# Patient Record
Sex: FEMALE | Race: ASIAN | NOT HISPANIC OR LATINO | URBAN - METROPOLITAN AREA
[De-identification: names, ages, dates, MRNs, and addresses within clinical notes are randomized per-mention and may not be internally consistent; named-entity substitution may affect disease eponyms.]

---

## 2018-10-01 ENCOUNTER — OUTPATIENT (OUTPATIENT)
Dept: OUTPATIENT SERVICES | Facility: HOSPITAL | Age: 83
LOS: 1 days | End: 2018-10-01

## 2018-10-30 ENCOUNTER — INPATIENT (INPATIENT)
Facility: HOSPITAL | Age: 83
LOS: 1 days | Discharge: HOME | End: 2018-11-01
Attending: HOSPITALIST | Admitting: HOSPITALIST
Payer: MEDICAID

## 2018-10-30 VITALS
OXYGEN SATURATION: 100 % | SYSTOLIC BLOOD PRESSURE: 180 MMHG | RESPIRATION RATE: 20 BRPM | TEMPERATURE: 97 F | HEART RATE: 80 BPM | DIASTOLIC BLOOD PRESSURE: 81 MMHG

## 2018-10-30 DIAGNOSIS — I16.1 HYPERTENSIVE EMERGENCY: ICD-10-CM

## 2018-10-30 DIAGNOSIS — Z29.9 ENCOUNTER FOR PROPHYLACTIC MEASURES, UNSPECIFIED: ICD-10-CM

## 2018-10-30 DIAGNOSIS — E87.1 HYPO-OSMOLALITY AND HYPONATREMIA: ICD-10-CM

## 2018-10-30 DIAGNOSIS — R10.9 UNSPECIFIED ABDOMINAL PAIN: ICD-10-CM

## 2018-10-30 DIAGNOSIS — Z98.890 OTHER SPECIFIED POSTPROCEDURAL STATES: Chronic | ICD-10-CM

## 2018-10-30 DIAGNOSIS — K08.89 OTHER SPECIFIED DISORDERS OF TEETH AND SUPPORTING STRUCTURES: Chronic | ICD-10-CM

## 2018-10-30 LAB
ALBUMIN SERPL ELPH-MCNC: 4.4 G/DL — SIGNIFICANT CHANGE UP (ref 3.3–5)
ALP SERPL-CCNC: 117 U/L — SIGNIFICANT CHANGE UP (ref 40–120)
ALT FLD-CCNC: 13 U/L — SIGNIFICANT CHANGE UP (ref 4–33)
AMYLASE P1 CFR SERPL: 88 U/L — SIGNIFICANT CHANGE UP (ref 25–125)
APPEARANCE UR: CLEAR — SIGNIFICANT CHANGE UP
AST SERPL-CCNC: 21 U/L — SIGNIFICANT CHANGE UP (ref 4–32)
BASE EXCESS BLDV CALC-SCNC: 1.1 MMOL/L — SIGNIFICANT CHANGE UP
BASOPHILS # BLD AUTO: 0.01 K/UL — SIGNIFICANT CHANGE UP (ref 0–0.2)
BASOPHILS NFR BLD AUTO: 0.2 % — SIGNIFICANT CHANGE UP (ref 0–2)
BILIRUB SERPL-MCNC: < 0.2 MG/DL — LOW (ref 0.2–1.2)
BILIRUB UR-MCNC: NEGATIVE — SIGNIFICANT CHANGE UP
BLOOD GAS VENOUS - CREATININE: 0.61 MG/DL — SIGNIFICANT CHANGE UP (ref 0.5–1.3)
BLOOD UR QL VISUAL: NEGATIVE — SIGNIFICANT CHANGE UP
BUN SERPL-MCNC: 5 MG/DL — LOW (ref 7–23)
BUN SERPL-MCNC: 6 MG/DL — LOW (ref 7–23)
CALCIUM SERPL-MCNC: 9.2 MG/DL — SIGNIFICANT CHANGE UP (ref 8.4–10.5)
CALCIUM SERPL-MCNC: 9.6 MG/DL — SIGNIFICANT CHANGE UP (ref 8.4–10.5)
CHLORIDE BLDV-SCNC: 91 MMOL/L — LOW (ref 96–108)
CHLORIDE SERPL-SCNC: 88 MMOL/L — LOW (ref 98–107)
CHLORIDE SERPL-SCNC: 92 MMOL/L — LOW (ref 98–107)
CO2 SERPL-SCNC: 23 MMOL/L — SIGNIFICANT CHANGE UP (ref 22–31)
CO2 SERPL-SCNC: 27 MMOL/L — SIGNIFICANT CHANGE UP (ref 22–31)
COLOR SPEC: SIGNIFICANT CHANGE UP
CREAT SERPL-MCNC: 0.61 MG/DL — SIGNIFICANT CHANGE UP (ref 0.5–1.3)
CREAT SERPL-MCNC: 0.63 MG/DL — SIGNIFICANT CHANGE UP (ref 0.5–1.3)
EOSINOPHIL # BLD AUTO: 0.11 K/UL — SIGNIFICANT CHANGE UP (ref 0–0.5)
EOSINOPHIL NFR BLD AUTO: 1.9 % — SIGNIFICANT CHANGE UP (ref 0–6)
GAS PNL BLDV: 124 MMOL/L — LOW (ref 136–146)
GLUCOSE BLDV-MCNC: 107 — HIGH (ref 70–99)
GLUCOSE SERPL-MCNC: 111 MG/DL — HIGH (ref 70–99)
GLUCOSE SERPL-MCNC: 168 MG/DL — HIGH (ref 70–99)
GLUCOSE UR-MCNC: NEGATIVE — SIGNIFICANT CHANGE UP
HCO3 BLDV-SCNC: 24 MMOL/L — SIGNIFICANT CHANGE UP (ref 20–27)
HCT VFR BLD CALC: 35.9 % — SIGNIFICANT CHANGE UP (ref 34.5–45)
HCT VFR BLDV CALC: 37.2 % — SIGNIFICANT CHANGE UP (ref 34.5–45)
HGB BLD-MCNC: 11.8 G/DL — SIGNIFICANT CHANGE UP (ref 11.5–15.5)
HGB BLDV-MCNC: 12.1 G/DL — SIGNIFICANT CHANGE UP (ref 11.5–15.5)
IMM GRANULOCYTES # BLD AUTO: 0.02 # — SIGNIFICANT CHANGE UP
IMM GRANULOCYTES NFR BLD AUTO: 0.3 % — SIGNIFICANT CHANGE UP (ref 0–1.5)
KETONES UR-MCNC: NEGATIVE — SIGNIFICANT CHANGE UP
LACTATE BLDV-MCNC: 1.1 MMOL/L — SIGNIFICANT CHANGE UP (ref 0.5–2)
LEUKOCYTE ESTERASE UR-ACNC: NEGATIVE — SIGNIFICANT CHANGE UP
LIDOCAIN IGE QN: 40.3 U/L — SIGNIFICANT CHANGE UP (ref 7–60)
LYMPHOCYTES # BLD AUTO: 2.35 K/UL — SIGNIFICANT CHANGE UP (ref 1–3.3)
LYMPHOCYTES # BLD AUTO: 40.4 % — SIGNIFICANT CHANGE UP (ref 13–44)
MCHC RBC-ENTMCNC: 24.9 PG — LOW (ref 27–34)
MCHC RBC-ENTMCNC: 32.9 % — SIGNIFICANT CHANGE UP (ref 32–36)
MCV RBC AUTO: 75.9 FL — LOW (ref 80–100)
MONOCYTES # BLD AUTO: 0.8 K/UL — SIGNIFICANT CHANGE UP (ref 0–0.9)
MONOCYTES NFR BLD AUTO: 13.7 % — SIGNIFICANT CHANGE UP (ref 2–14)
NEUTROPHILS # BLD AUTO: 2.53 K/UL — SIGNIFICANT CHANGE UP (ref 1.8–7.4)
NEUTROPHILS NFR BLD AUTO: 43.5 % — SIGNIFICANT CHANGE UP (ref 43–77)
NITRITE UR-MCNC: NEGATIVE — SIGNIFICANT CHANGE UP
NRBC # FLD: 0 — SIGNIFICANT CHANGE UP
OSMOLALITY SERPL: 263 MOSMO/KG — LOW (ref 275–295)
PCO2 BLDV: 48 MMHG — SIGNIFICANT CHANGE UP (ref 41–51)
PH BLDV: 7.35 PH — SIGNIFICANT CHANGE UP (ref 7.32–7.43)
PH UR: 7 — SIGNIFICANT CHANGE UP (ref 5–8)
PLATELET # BLD AUTO: 314 K/UL — SIGNIFICANT CHANGE UP (ref 150–400)
PMV BLD: 8.9 FL — SIGNIFICANT CHANGE UP (ref 7–13)
PO2 BLDV: < 24 MMHG — LOW (ref 35–40)
POTASSIUM BLDV-SCNC: 4.3 MMOL/L — SIGNIFICANT CHANGE UP (ref 3.4–4.5)
POTASSIUM SERPL-MCNC: 4 MMOL/L — SIGNIFICANT CHANGE UP (ref 3.5–5.3)
POTASSIUM SERPL-MCNC: 4.5 MMOL/L — SIGNIFICANT CHANGE UP (ref 3.5–5.3)
POTASSIUM SERPL-SCNC: 4 MMOL/L — SIGNIFICANT CHANGE UP (ref 3.5–5.3)
POTASSIUM SERPL-SCNC: 4.5 MMOL/L — SIGNIFICANT CHANGE UP (ref 3.5–5.3)
PROT SERPL-MCNC: 7.6 G/DL — SIGNIFICANT CHANGE UP (ref 6–8.3)
PROT UR-MCNC: NEGATIVE — SIGNIFICANT CHANGE UP
RBC # BLD: 4.73 M/UL — SIGNIFICANT CHANGE UP (ref 3.8–5.2)
RBC # FLD: 14.6 % — HIGH (ref 10.3–14.5)
SAO2 % BLDV: 27.9 % — LOW (ref 60–85)
SODIUM SERPL-SCNC: 124 MMOL/L — LOW (ref 135–145)
SODIUM SERPL-SCNC: 130 MMOL/L — LOW (ref 135–145)
SODIUM UR-SCNC: 53 MMOL/L — SIGNIFICANT CHANGE UP
SP GR SPEC: 1.01 — SIGNIFICANT CHANGE UP (ref 1–1.04)
TROPONIN T, HIGH SENSITIVITY: 9 NG/L — SIGNIFICANT CHANGE UP (ref ?–14)
TSH SERPL-MCNC: 4.64 UIU/ML — HIGH (ref 0.27–4.2)
UROBILINOGEN FLD QL: NORMAL — SIGNIFICANT CHANGE UP
WBC # BLD: 5.82 K/UL — SIGNIFICANT CHANGE UP (ref 3.8–10.5)
WBC # FLD AUTO: 5.82 K/UL — SIGNIFICANT CHANGE UP (ref 3.8–10.5)

## 2018-10-30 PROCEDURE — 74177 CT ABD & PELVIS W/CONTRAST: CPT | Mod: 26

## 2018-10-30 RX ORDER — SODIUM CHLORIDE 9 MG/ML
500 INJECTION, SOLUTION INTRAVENOUS ONCE
Qty: 0 | Refills: 0 | Status: COMPLETED | OUTPATIENT
Start: 2018-10-30 | End: 2018-10-30

## 2018-10-30 RX ORDER — INFLUENZA VIRUS VACCINE 15; 15; 15; 15 UG/.5ML; UG/.5ML; UG/.5ML; UG/.5ML
0.5 SUSPENSION INTRAMUSCULAR ONCE
Qty: 0 | Refills: 0 | Status: COMPLETED | OUTPATIENT
Start: 2018-10-30 | End: 2018-11-01

## 2018-10-30 RX ORDER — SENNA PLUS 8.6 MG/1
2 TABLET ORAL AT BEDTIME
Qty: 0 | Refills: 0 | Status: DISCONTINUED | OUTPATIENT
Start: 2018-10-30 | End: 2018-11-01

## 2018-10-30 RX ORDER — POLYETHYLENE GLYCOL 3350 17 G/17G
17 POWDER, FOR SOLUTION ORAL
Qty: 0 | Refills: 0 | Status: DISCONTINUED | OUTPATIENT
Start: 2018-10-30 | End: 2018-11-01

## 2018-10-30 RX ADMIN — SODIUM CHLORIDE 500 MILLILITER(S): 9 INJECTION, SOLUTION INTRAVENOUS at 16:34

## 2018-10-30 RX ADMIN — POLYETHYLENE GLYCOL 3350 17 GRAM(S): 17 POWDER, FOR SOLUTION ORAL at 23:07

## 2018-10-30 RX ADMIN — SENNA PLUS 2 TABLET(S): 8.6 TABLET ORAL at 23:07

## 2018-10-30 NOTE — ED PROVIDER NOTE - PROGRESS NOTE DETAILS
Annette HERNANDEZ, PGY-4: Pt offered pain meds but declining at this time. Will reassess and provide pain meds as needed.

## 2018-10-30 NOTE — H&P ADULT - PROBLEM SELECTOR PLAN 3
- chronic abdominal pain likely in setting of constipation (CT A/P revealing marked stool  - start  on bowel regiment with miralax and senna- if no response, can try enema - chronic abdominal pain likely in setting of constipation (CT A/P revealing marked stool  - start  on bowel regimen with miralax and senna- if no response, can try enema recurrent this AM, unclear etiology; thought to be secondary to poorly controlled hypertension however this AM BP better controlled  trial of meclizine  would perform Katie-Hallpike when family returns to interpret (patient having difficulty following commands from  via phone)  fall precautions, PT consult  CT head pending

## 2018-10-30 NOTE — H&P ADULT - ASSESSMENT
89F with no reported PMHx, presents with one episode of dizziness and chronic abdominal pain, found to have hypo-osmolar hyponatremia, and hypertensive emergency.

## 2018-10-30 NOTE — H&P ADULT - ATTENDING COMMENTS
Agree with resident H&P and plan as edited above.     89F poor historian, family no longer present at bedside reports intermittent dizziness, states she feels as if the room is spinning around her, feels it during my interview/exam as well.  She reports she last fell last night, with (+)LOC, no loss of bowel/bladder control, no preceding cardiac or presyncopal symptoms.  States she was "out" for 4-5 hours then regained consciousness slowly.  Patient is unaware of her medical problems/history or medications that she takes.  CT H pending given unclear history of falls.  Neuro exam intact, will try meclizine for possible BPPV, would attempt to perform Lodi-Hallpike maneuver w/patient in AM, did not attempt during my exam due to difficulty in getting patient to follow my commands with  (multiple attempts/translations required for neuro exam with demonstration from examiner as well). If dizziness persists, may consider stroke w/u w/transfer to tele.

## 2018-10-30 NOTE — H&P ADULT - NSHPPHYSICALEXAM_GEN_ALL_CORE
General: elderly female, laying in bed, NAD  Neurology: A&Ox2 (baseline); non focal neuro exam  ENT:  Mucosa moist, poor dentition; cataracts in b/l eyes;   Neck:  Supple, no sinuses or palpable masses  Lymphatic:  No palpable cervical, supraclavicular, axillary or inguinal adenopathy  Respiratory: CTA B/L  CV: RRR, S1S2, no murmur  Abdominal: Soft, tender to palpation diffusely- markedly in b/l lower quadrants, +NS  MSK: No edema, + peripheral pulses, FROM all 4 extremity General: elderly female, laying in bed, NAD  Neurology: A&Ox2 (baseline); non focal neuro exam;   ENT:  Mucosa moist, poor dentition; cataracts in b/l eyes;   Neck:  Supple, no sinuses or palpable masses  Lymphatic:  No palpable cervical, supraclavicular, axillary or inguinal adenopathy  Respiratory: CTA B/L  CV: RRR, S1S2, no murmur  Abdominal: Soft, tender to palpation diffusely- markedly in b/l lower quadrants, +NS  MSK: No edema, + peripheral pulses, FROM all 4 extremity General: elderly female, laying in bed, NAD  Neurology: A&Ox2 (baseline); non focal neuro exam- CN intact; strength grossly preserved in upper and lower extremities  ENT:  Mucosa moist, poor dentition; cataracts in b/l eyes;   Neck:  Supple, no sinuses or palpable masses  Lymphatic:  No palpable cervical, supraclavicular, axillary or inguinal adenopathy  Respiratory: CTA B/L  CV: RRR, S1S2, no murmur  Abdominal: Soft, tender to palpation diffusely- markedly in b/l lower quadrants, +NS  MSK: No edema, + peripheral pulses, FROM all 4 extremity General: elderly female, laying in bed, NAD  Neurology: A&Ox2 (baseline); non focal neuro exam- CN intact; strength grossly preserved in upper and lower extremities; FTN intact b/l  ENT:  Mucosa moist, poor dentition; cataracts in b/l eyes w/almost complete opacification of L iris   Neck:  Supple, no sinuses or palpable masses  Lymphatic:  No palpable cervical, supraclavicular, axillary or inguinal adenopathy  Respiratory: CTA B/L  CV: RRR, S1S2, no murmur  Abdominal: Soft, tender to palpation diffusely- markedly in b/l lower quadrants, +BS  MSK: No edema, + peripheral pulses, FROM all 4 extremity

## 2018-10-30 NOTE — H&P ADULT - NSHPLABSRESULTS_GEN_ALL_CORE
11.8   5.82  )-----------( 314      ( 30 Oct 2018 15:00 )             35.9       10-30    124<L>  |  88<L>  |  6<L>  ----------------------------<  111<H>  4.0   |  23  |  0.63    Ca    9.2      30 Oct 2018 15:00    TPro  7.6  /  Alb  4.4  /  TBili  < 0.2<L>  /  DBili  x   /  AST  21  /  ALT  13  /  AlkPhos  117  10-30                15:10 - VBG - pH: 7.35  | pCO2: 48    | pO2: < 24  | Lactate: 1.1        Urinalysis Basic - ( 30 Oct 2018 15:10 )    Color: LIGHT YELLOW / Appearance: CLEAR / S.009 / pH: 7.0  Gluc: NEGATIVE / Ketone: NEGATIVE  / Bili: NEGATIVE / Urobili: NORMAL   Blood: NEGATIVE / Protein: NEGATIVE / Nitrite: NEGATIVE   Leuk Esterase: NEGATIVE / RBC: x / WBC x   Sq Epi: x / Non Sq Epi: x / Bacteria: x        EKG:  normal sinus rhythm  CXR:  not done    < from: CT Abdomen and Pelvis w/ IV Cont (10.30.18 @ 18:11) >    IMPRESSION:     No acute intra-abdominal pathology.    A 6 mm right middle lobe solid pulmonary nodule, for which noncontrast   chest CT in 6 months is recommended.    Age indeterminate compression fractures at T12 and L2.      < end of copied text > 10-30    130<L>  |  92<L>  |  5<L>  ----------------------------<  168<H>  4.5   |  27  |  0.61    Ca    9.6      30 Oct 2018 22:37    TPro  7.6  /  Alb  4.4  /  TBili  < 0.2<L>  /  DBili  x   /  AST  21  /  ALT  13  /  AlkPhos  117  10-30                        11.8   5.82  )-----------( 314      ( 30 Oct 2018 15:00 )             35.9     Urinalysis Basic - ( 30 Oct 2018 15:10 )  Color: LIGHT YELLOW / Appearance: CLEAR / S.009 / pH: 7.0  Gluc: NEGATIVE / Ketone: NEGATIVE  / Bili: NEGATIVE / Urobili: NORMAL   Blood: NEGATIVE / Protein: NEGATIVE / Nitrite: NEGATIVE   Leuk Esterase: NEGATIVE / RBC: x / WBC x   Sq Epi: x / Non Sq Epi: x / Bacteria: x    15:10 - VBG - pH: 7.35  | pCO2: 48    | pO2: < 24  | Lactate: 1.1      Osmolality, Random Urine: 204 mosmo/kg (10.30.18 @ 22:37)  Sodium, Serum: 130 mmol/L (10.30.18 @ 22:37)    Osmolality, Serum: 263 mosmo/kg (10.30.18 @ 15:00)    Troponin T, High Sensitivity: 9 ng/L (10.30.18 @ 15:00)    Thyroid Stimulating Hormone, Serum: 4.64 uIU/mL (10.30.18 @ 22:37)  Free Thyroxine, Serum: 0.95 ng/dL (10.30.18 @ 22:37)    Amylase/Lipase (10.30.18 @ 15:00)    Lipase, Serum: 40.3 U/L    Amylase, Serum Total: 88 u/L    EKG:  normal sinus rhythm  CXR:  not done    < from: CT Abdomen and Pelvis w/ IV Cont (10.30.18 @ 18:11) >  LOWER CHEST: A 6 mm right middle lobe solid pulmonary nodule (series 2, image 4). Moderate size hiatal hernia.  LIVER: Within normal limits.  BILE DUCTS: Normal caliber.  GALLBLADDER: Within normal limits.  SPLEEN: Within normal limits.  PANCREAS: Within normal limits.  ADRENALS: Within normal limits.  KIDNEYS/URETERS: Subcentimeter hypodense renal foci, too small to characterize.  BLADDER: Within normal limits.  REPRODUCTIVE ORGANS: The uterus and adnexa are within normal limits.  BOWEL: No bowel obstruction. Appendix is not visualized.  PERITONEUM: No ascites.  VESSELS:  Within normal limits.  RETROPERITONEUM: No lymphadenopathy.    ABDOMINAL WALL: Tiny fat-containing umbilical hernia.  BONES: Degenerative degenerative changes of the spine including mild compression deformities of T12 and L2, age indeterminate.  IMPRESSION: No acute intra-abdominal pathology. A 6 mm right middle lobe solid pulmonary nodule, for which noncontrast chest CT in 6 months is recommended. Age indeterminate compression fractures at T12 and L2.  < end of copied text > 10-30    130<L>  |  92<L>  |  5<L>  ----------------------------<  168<H>  4.5   |  27  |  0.61    Ca    9.6      30 Oct 2018 22:37    TPro  7.6  /  Alb  4.4  /  TBili  < 0.2<L>  /  DBili  x   /  AST  21  /  ALT  13  /  AlkPhos  117  10-30                        11.8   5.82  )-----------( 314      ( 30 Oct 2018 15:00 )             35.9     Urinalysis Basic - ( 30 Oct 2018 15:10 )  Color: LIGHT YELLOW / Appearance: CLEAR / S.009 / pH: 7.0  Gluc: NEGATIVE / Ketone: NEGATIVE  / Bili: NEGATIVE / Urobili: NORMAL   Blood: NEGATIVE / Protein: NEGATIVE / Nitrite: NEGATIVE   Leuk Esterase: NEGATIVE / RBC: x / WBC x   Sq Epi: x / Non Sq Epi: x / Bacteria: x    15:10 - VBG - pH: 7.35  | pCO2: 48    | pO2: < 24  | Lactate: 1.1      Osmolality, Random Urine: 204 mosmo/kg (10.30.18 @ 22:37)  Sodium, Serum: 130 mmol/L (10.30.18 @ 22:37)    Osmolality, Serum: 263 mosmo/kg (10.30.18 @ 15:00)    Troponin T, High Sensitivity: 9 ng/L (10.30.18 @ 15:00)    Thyroid Stimulating Hormone, Serum: 4.64 uIU/mL (10.30.18 @ 22:37)  Free Thyroxine, Serum: 0.95 ng/dL (10.30.18 @ 22:37)    Amylase/Lipase (10.30.18 @ 15:00)    Lipase, Serum: 40.3 U/L    Amylase, Serum Total: 88 u/L    < from: CT Abdomen and Pelvis w/ IV Cont (10.30.18 @ 18:11) >  LOWER CHEST: A 6 mm right middle lobe solid pulmonary nodule (series 2, image 4). Moderate size hiatal hernia.  LIVER: Within normal limits.  BILE DUCTS: Normal caliber.  GALLBLADDER: Within normal limits.  SPLEEN: Within normal limits.  PANCREAS: Within normal limits.  ADRENALS: Within normal limits.  KIDNEYS/URETERS: Subcentimeter hypodense renal foci, too small to characterize.  BLADDER: Within normal limits.  REPRODUCTIVE ORGANS: The uterus and adnexa are within normal limits.  BOWEL: No bowel obstruction. Appendix is not visualized.  PERITONEUM: No ascites.  VESSELS:  Within normal limits.  RETROPERITONEUM: No lymphadenopathy.    ABDOMINAL WALL: Tiny fat-containing umbilical hernia.  BONES: Degenerative changes of the spine including mild compression deformities of T12 and L2, age indeterminate.  IMPRESSION: No acute intra-abdominal pathology. A 6 mm right middle lobe solid pulmonary nodule, for which noncontrast chest CT in 6 months is recommended. Age indeterminate compression fractures at T12 and L2.  < end of copied text >    EKG personally reviewed:  81bpm normal sinus rhythm, QTc 448ms

## 2018-10-30 NOTE — H&P ADULT - PROBLEM SELECTOR PLAN 1
- patient with Na of 124; and serum osm of 263 indicated hypo-osmolar hyponatremia  - on examination, patient appears euvolemic thus ddx includes SIADH, hypothyroids, or medication induced (as unclear at this time what patient takes at home)  - will follow up urine studies and repeat BMP as pt given 500cc LR bolus in the ED  - follow up TSH level  - BMP q6 - patient with Na of 124; and serum osm of 263 indicated hypo-osmolar hyponatremia  - on examination, patient appears euvolemic thus ddx includes SIADH, hypothyroids, or medication induced (as unclear at this time what patient takes at home)  - will follow up urine studies and repeat BMP as pt given 500cc LR bolus in the ED  - follow up TSH level  - BMP q6  - CT head to evaluate for ICH in setting of SIADH on ddx - patient with Na of 124; and serum osm of 263 indicated hypo-osmolar hyponatremia  - on examination, patient appears euvolemic thus ddx includes SIADH, hypothyroids, or medication induced (as unclear at this time what patient takes at home)  - urine studies c/w SIADH; repeat BMP s/p 500cc LR bolus in the ED showed improvement after IVF ->unclear etiology  - free T4 wnl  - BMP q6  - CT head to evaluate for ICH in setting of SIADH on ddx

## 2018-10-30 NOTE — ED ADULT NURSE NOTE - OBJECTIVE STATEMENT
pt brought in by family for weakness/Nausea/LLQ pain since yest. pt AOX 3 denies dysuria/hematuria,  lungs clear, resp. equal 16-20b/min Iv to right AC 20g angio cath.   UA sent clear yin.   pending dispo.    Macrina PEÑA (covering for break)

## 2018-10-30 NOTE — ED PROVIDER NOTE - CONSTITUTIONAL, MLM
normal... Well appearing, well nourished, awake, alert, oriented to person, place, time/situation and in mild pain

## 2018-10-30 NOTE — H&P ADULT - NSHPSOCIALHISTORY_GEN_ALL_CORE
Non smoker  Non drinker  Lives at home with son and his family  Used to work in [employer] in the [place]  In  for >5 years  Ambulates at baseline with a cane- independent of ADLs

## 2018-10-30 NOTE — H&P ADULT - HISTORY OF PRESENT ILLNESS
89F with PMHx of HTN presents with 2 days of abdominal pain. Patient with grandson at bedside who is providing translation. Patient states abdominal pain in the left lower quadrant ove rthe past ____ days. This morning, she had an episdoe of lightheadedness when the pain came on, but did not fall or loose conciiousness. Her last bowel movement was 2 days prior and patient continues to pass gas. She has not had pain like this in the past.     Grandson reports patient has had__    In ED, VS /81; HR 80; RR20; O2 sat 100%on room air, afebrile at 97.2F0. 89F with no significant PMHx presents with 1 day of dizziness and chronic abdominal pain. Patient with grandson at bedside who is providing translation. Patient states she was in bed today, and she developed dizziness and felt the room spinning around, and was unable to get herself out of bed since the dizziness. This episode prompted her family to bring her in. In addition, patient reports years of intermittent abdominal pain. She takes a medication for the pain, but her nor her grandson can recall the medication. She has some pain at this time most significantly in the b/l lower quadrants. Patient reports last BM ~3 days prior, and is continuing to pass gas. She states she feels constipated.    Patient has been living in the [place] for > [place] years, however does not follow with a primary care doctor. Per son, she does not have any chronic medical problems. She takes a medication for her dentures and her abdominal pain, but cannot remember the name. Patient and grandon also deny any changes in dietary habits, or any weight gain or weight loss    In ED, VS /81; HR 80; RR20; O2 sat 100%on room air, afebrile at 97.2F. She underwent CT A/P which did not reveal any intra-abdominal pathology, but did reveal T12/L1 compression fractures. Per grandson, patient did have a fall few months prior in the bathroom at night, and the next morning, did not have any deficits and was able to ambulate at baseline.

## 2018-10-30 NOTE — ED PROVIDER NOTE - ATTENDING CONTRIBUTION TO CARE
I performed a face to face evaluation of this patient and performed a full history and physical examination on the patient.  I agree with the resident's history, physical examination, and plan of the patient. pt with abd pain, more left side, with soft abdomen, no rebound or guarding.  no masses, labs, CT scan and reassess, consider GI, surgical etiology.

## 2018-10-30 NOTE — ED ADULT NURSE REASSESSMENT NOTE - NS ED NURSE REASSESS COMMENT FT1
Pt resting comfortably in bed, denies any pain/ symptoms at this time, pt stable, report given to floor RN, will continue to monitor.

## 2018-10-30 NOTE — ED ADULT NURSE REASSESSMENT NOTE - GENERAL PATIENT STATE
resting/sleeping/comfortable appearance/cooperative/family/SO at bedside/smiling/interactive/no change observed

## 2018-10-30 NOTE — ED ADULT NURSE NOTE - NSIMPLEMENTINTERV_GEN_ALL_ED
Implemented All Fall with Harm Risk Interventions:  [place] to call system. Call bell, personal items and telephone within reach. Instruct patient to call for assistance. Room bathroom lighting operational. Non-slip footwear when patient is off stretcher. Physically safe environment: no spills, clutter or unnecessary equipment. Stretcher in lowest position, wheels locked, appropriate side rails in place. Provide visual cue, wrist band, yellow gown, etc. Monitor gait and stability. Monitor for mental status changes and reorient to person, place, and time. Review medications for side effects contributing to fall risk. Reinforce activity limits and safety measures with patient and family. Provide visual clues: red socks.

## 2018-10-30 NOTE — H&P ADULT - PROBLEM SELECTOR PLAN 2
- patient presenting with BP of 180/81 up to 186/91 with symptoms of dizziness and blurred vision  - BP down to systolic 150s without intervention and currently at 170s  - will start anti-hypertensive medication-; Cr clearance 48; will start low dose hydralazine and titrate medication function (Cr clearance - patient presenting with BP of 180/81 up to 186/91 with symptoms of dizziness and blurred vision  - BP down to systolic 150s without intervention and currently at 170s  - will start anti-hypertensive medication-; Cr clearance 48; will start low dose hydralazine and titrate medication

## 2018-10-30 NOTE — H&P ADULT - PROBLEM SELECTOR PLAN 5
- hold chemical ppx until CT head obtained  - diet- DASH T12/L2 age-indeterminate compression fractures incidentally noted on CT  Reports only abdominal pain  fall precautions, Vit D level

## 2018-10-30 NOTE — H&P ADULT - PROBLEM SELECTOR PLAN 4
- heparin subq for DVT ppx  - diet- DASH - hold chemical ppx until CT head obtained  - diet- DASH T12/L2 age-indeterminate compression fractures incidentally noted on CT T12/L2 age-indeterminate compression fractures incidentally noted on CT  Reports only abdominal pain  fall precautions, Vit D level - chronic abdominal pain likely in setting of constipation (CT A/P revealing marked stool  - start  on bowel regimen with miralax and senna- if no response, can try enema

## 2018-10-30 NOTE — ED ADULT NURSE NOTE - CHPI ED NUR SYMPTOMS NEG
no abdominal distension/no dysuria/no hematuria/no blood in stool/no burning urination/no chills/no fever/no diarrhea

## 2018-10-30 NOTE — H&P ADULT - NSHPREVIEWOFSYSTEMS_GEN_ALL_CORE
REVIEW OF SYSTEMS:    CONSTITUTIONAL: + feels cold; No weakness, no fevers  EYES/ENT: No visual changes;  No vertigo or throat pain   NECK: No pain or stiffness  RESPIRATORY: No cough, wheezing, hemoptysis; No shortness of breath  CARDIOVASCULAR: No chest pain or palpitations  GASTROINTESTINAL: + abdominal pain. No nausea, vomiting, or hematemesis; No diarrhea or constipation. No melena or hematochezia.  GENITOURINARY: No dysuria, frequency or hematuria  NEUROLOGICAL: No numbness or weakness  SKIN: No itching, rashes REVIEW OF SYSTEMS:    CONSTITUTIONAL: + feels cold; No weakness, no fevers  EYES/ENT: No visual changes;  (+) vertigo; No throat pain   NECK: No pain or stiffness  RESPIRATORY: No cough, wheezing, hemoptysis; No shortness of breath  CARDIOVASCULAR: No chest pain or palpitations  GASTROINTESTINAL: + abdominal pain. No nausea, vomiting, or hematemesis; No diarrhea or constipation. No melena or hematochezia.  GENITOURINARY: No dysuria, frequency or hematuria  NEUROLOGICAL: No numbness or weakness  SKIN: No itching, rashes

## 2018-10-30 NOTE — ED PROVIDER NOTE - CARE PLAN
Principal Discharge DX:	Abdominal pain Principal Discharge DX:	Hyponatremia  Secondary Diagnosis:	Abdominal pain

## 2018-10-30 NOTE — ED ADULT NURSE NOTE - CHIEF COMPLAINT QUOTE
pt coming with weakness /ABD pain/dizziness  sine yest. + nausea, pt denies CP at present time.     [id number]   Hx HTN

## 2018-10-30 NOTE — ED ADULT TRIAGE NOTE - CHIEF COMPLAINT QUOTE
pt coming with weakness /ABD pain/dizziness  sine yest. + nausea, pt denies CP at present time.     [id number]   Hx HTN

## 2018-10-30 NOTE — ED PROVIDER NOTE - MEDICAL DECISION MAKING DETAILS
pt with abd pain, more left side, with soft abdomen, no rebound or guarding.  no masses, labs, CT scan and reassess, consider GI, surgical etiology.

## 2018-10-30 NOTE — ED PROVIDER NOTE - OBJECTIVE STATEMENT
Patient presents with abd pain- presents with grandson 29 yo who speaks English and pt's language fluently- preferred him to translate.  c/o abd pain, no n/v/d, or cp. Had episode of lightheaded with pain but no syncope.  No fever, sob, cough. no dysuria Patient presents with abd pain- presents with grandson (Jorge Luis Blum) 27 yo who speaks English and pt's language fluently- preferred him to translate.  c/o abd pain, no n/v/d, or cp. Had episode of lightheaded with pain but no syncope.  No fever, sob, cough. no dysuria. Normal appetite. Last BM 2 days ago, passing flatus, no hx abd surgeries. Pt also reports feeling dizzy this morning.

## 2018-10-31 ENCOUNTER — TRANSCRIPTION ENCOUNTER (OUTPATIENT)
Age: 83
End: 2018-10-31

## 2018-10-31 DIAGNOSIS — Z79.899 OTHER LONG TERM (CURRENT) DRUG THERAPY: ICD-10-CM

## 2018-10-31 DIAGNOSIS — R42 DIZZINESS AND GIDDINESS: ICD-10-CM

## 2018-10-31 DIAGNOSIS — S32.020A WEDGE COMPRESSION FRACTURE OF SECOND LUMBAR VERTEBRA, INITIAL ENCOUNTER FOR CLOSED FRACTURE: ICD-10-CM

## 2018-10-31 DIAGNOSIS — Z71.89 OTHER SPECIFIED COUNSELING: ICD-10-CM

## 2018-10-31 LAB
24R-OH-CALCIDIOL SERPL-MCNC: 9.8 NG/ML — LOW (ref 30–80)
BUN SERPL-MCNC: 6 MG/DL — LOW (ref 7–23)
BUN SERPL-MCNC: 8 MG/DL — SIGNIFICANT CHANGE UP (ref 7–23)
CALCIUM SERPL-MCNC: 8.9 MG/DL — SIGNIFICANT CHANGE UP (ref 8.4–10.5)
CALCIUM SERPL-MCNC: 9.3 MG/DL — SIGNIFICANT CHANGE UP (ref 8.4–10.5)
CHLORIDE SERPL-SCNC: 93 MMOL/L — LOW (ref 98–107)
CHLORIDE SERPL-SCNC: 93 MMOL/L — LOW (ref 98–107)
CO2 SERPL-SCNC: 24 MMOL/L — SIGNIFICANT CHANGE UP (ref 22–31)
CO2 SERPL-SCNC: 28 MMOL/L — SIGNIFICANT CHANGE UP (ref 22–31)
CREAT SERPL-MCNC: 0.62 MG/DL — SIGNIFICANT CHANGE UP (ref 0.5–1.3)
CREAT SERPL-MCNC: 0.64 MG/DL — SIGNIFICANT CHANGE UP (ref 0.5–1.3)
GLUCOSE SERPL-MCNC: 103 MG/DL — HIGH (ref 70–99)
GLUCOSE SERPL-MCNC: 123 MG/DL — HIGH (ref 70–99)
MAGNESIUM SERPL-MCNC: 2.1 MG/DL — SIGNIFICANT CHANGE UP (ref 1.6–2.6)
OSMOLALITY UR: 204 MOSMO/KG — SIGNIFICANT CHANGE UP (ref 50–1200)
PHOSPHATE SERPL-MCNC: 4.1 MG/DL — SIGNIFICANT CHANGE UP (ref 2.5–4.5)
POTASSIUM SERPL-MCNC: 4.2 MMOL/L — SIGNIFICANT CHANGE UP (ref 3.5–5.3)
POTASSIUM SERPL-MCNC: 4.9 MMOL/L — SIGNIFICANT CHANGE UP (ref 3.5–5.3)
POTASSIUM SERPL-SCNC: 4.2 MMOL/L — SIGNIFICANT CHANGE UP (ref 3.5–5.3)
POTASSIUM SERPL-SCNC: 4.9 MMOL/L — SIGNIFICANT CHANGE UP (ref 3.5–5.3)
SODIUM SERPL-SCNC: 127 MMOL/L — LOW (ref 135–145)
SODIUM SERPL-SCNC: 130 MMOL/L — LOW (ref 135–145)
T4 FREE SERPL-MCNC: 0.95 NG/DL — SIGNIFICANT CHANGE UP (ref 0.9–1.8)
VIT B12 SERPL-MCNC: 238 PG/ML — SIGNIFICANT CHANGE UP (ref 200–900)

## 2018-10-31 PROCEDURE — 12345: CPT | Mod: NC

## 2018-10-31 PROCEDURE — 70450 CT HEAD/BRAIN W/O DYE: CPT | Mod: 26

## 2018-10-31 PROCEDURE — 99223 1ST HOSP IP/OBS HIGH 75: CPT | Mod: GC

## 2018-10-31 RX ORDER — OMEPRAZOLE 10 MG/1
1 CAPSULE, DELAYED RELEASE ORAL
Qty: 30 | Refills: 0
Start: 2018-10-31 | End: 2018-11-29

## 2018-10-31 RX ORDER — HYDRALAZINE HCL 50 MG
10 TABLET ORAL THREE TIMES A DAY
Qty: 0 | Refills: 0 | Status: DISCONTINUED | OUTPATIENT
Start: 2018-10-31 | End: 2018-11-01

## 2018-10-31 RX ORDER — MECLIZINE HCL 12.5 MG
12.5 TABLET ORAL EVERY 12 HOURS
Qty: 0 | Refills: 0 | Status: DISCONTINUED | OUTPATIENT
Start: 2018-10-31 | End: 2018-11-01

## 2018-10-31 RX ORDER — HYDRALAZINE HCL 50 MG
10 TABLET ORAL
Qty: 0 | Refills: 0 | Status: DISCONTINUED | OUTPATIENT
Start: 2018-10-31 | End: 2018-10-31

## 2018-10-31 RX ORDER — SODIUM CHLORIDE 9 MG/ML
1 INJECTION INTRAMUSCULAR; INTRAVENOUS; SUBCUTANEOUS THREE TIMES A DAY
Qty: 0 | Refills: 0 | Status: DISCONTINUED | OUTPATIENT
Start: 2018-10-31 | End: 2018-11-01

## 2018-10-31 RX ORDER — ENOXAPARIN SODIUM 100 MG/ML
40 INJECTION SUBCUTANEOUS DAILY
Qty: 0 | Refills: 0 | Status: DISCONTINUED | OUTPATIENT
Start: 2018-10-31 | End: 2018-11-01

## 2018-10-31 RX ORDER — HYDRALAZINE HCL 50 MG
1 TABLET ORAL
Qty: 90 | Refills: 0
Start: 2018-10-31 | End: 2018-11-29

## 2018-10-31 RX ORDER — MECLIZINE HCL 12.5 MG
1 TABLET ORAL
Qty: 20 | Refills: 0
Start: 2018-10-31 | End: 2018-11-09

## 2018-10-31 RX ORDER — SODIUM CHLORIDE 9 MG/ML
500 INJECTION INTRAMUSCULAR; INTRAVENOUS; SUBCUTANEOUS
Qty: 0 | Refills: 0 | Status: DISCONTINUED | OUTPATIENT
Start: 2018-10-31 | End: 2018-10-31

## 2018-10-31 RX ORDER — SENNA PLUS 8.6 MG/1
2 TABLET ORAL
Qty: 0 | Refills: 0 | DISCHARGE
Start: 2018-10-31

## 2018-10-31 RX ORDER — ERGOCALCIFEROL 1.25 MG/1
50000 CAPSULE ORAL
Qty: 0 | Refills: 0 | Status: DISCONTINUED | OUTPATIENT
Start: 2018-10-31 | End: 2018-11-01

## 2018-10-31 RX ORDER — PANTOPRAZOLE SODIUM 20 MG/1
40 TABLET, DELAYED RELEASE ORAL
Qty: 0 | Refills: 0 | Status: DISCONTINUED | OUTPATIENT
Start: 2018-10-31 | End: 2018-11-01

## 2018-10-31 RX ADMIN — SODIUM CHLORIDE 100 MILLILITER(S): 9 INJECTION INTRAMUSCULAR; INTRAVENOUS; SUBCUTANEOUS at 11:14

## 2018-10-31 RX ADMIN — Medication 10 MILLIGRAM(S): at 03:35

## 2018-10-31 RX ADMIN — Medication 10 MILLIGRAM(S): at 22:25

## 2018-10-31 RX ADMIN — Medication 10 MILLIGRAM(S): at 13:18

## 2018-10-31 RX ADMIN — PANTOPRAZOLE SODIUM 40 MILLIGRAM(S): 20 TABLET, DELAYED RELEASE ORAL at 13:19

## 2018-10-31 RX ADMIN — ENOXAPARIN SODIUM 40 MILLIGRAM(S): 100 INJECTION SUBCUTANEOUS at 17:16

## 2018-10-31 RX ADMIN — SODIUM CHLORIDE 1 GRAM(S): 9 INJECTION INTRAMUSCULAR; INTRAVENOUS; SUBCUTANEOUS at 22:25

## 2018-10-31 RX ADMIN — SENNA PLUS 2 TABLET(S): 8.6 TABLET ORAL at 22:25

## 2018-10-31 NOTE — DISCHARGE NOTE ADULT - INSTRUCTIONS
If any complaints of nausea, vomiting, diarrhea, or fever call primary MD or return to emergency room Regular

## 2018-10-31 NOTE — DISCHARGE NOTE ADULT - OTHER SIGNIFICANT FINDINGS
PROCEDURE:   CT of the Abdomen and Pelvis was performed with intravenous contrast.   Intravenous contrast: 90 ml Omnipaque 350. 10 ml discarded.  Oral contrast: None.  Sagittal and coronal reformats were performed.    FINDINGS:    LOWER CHEST: A 6 mm right middle lobe solid pulmonary nodule (series 2,   image 4). Moderate size hiatal hernia.    LIVER: Within normal limits.  BILE DUCTS: Normal caliber.  GALLBLADDER: Within normal limits.  SPLEEN: Within normal limits.  PANCREAS: Within normal limits.  ADRENALS: Within normal limits.  KIDNEYS/URETERS: Subcentimeter hypodense renal foci, too small to   characterize.    BLADDER: Within normal limits.  REPRODUCTIVE ORGANS: The uterus and adnexa are within normal limits.    BOWEL: No bowel obstruction. Appendix is not visualized.  PERITONEUM: No ascites.  VESSELS:  Within normal limits.  RETROPERITONEUM: No lymphadenopathy.    ABDOMINAL WALL: Tiny fat-containing umbilical hernia.  BONES: Degenerative degenerative changes of the spine including mild   compression deformities of T12 and L2, age indeterminate.    EXAM:  CT BRAIN        PROCEDURE DATE:  Oct 31 2018         INTERPRETATION:  History: Fall. Dizziness.    Multiple axial sections were performed from base of skull to vertex   without contrast enhancement.    Parenchymal volume loss and chronic microvascular changes are identified.    There is no evidence of acute hemorrhage mass mass effect in the   posterior fossa or supratentorial region.    Evaluation of the osseous structures with the appropriate window appears   unremarkable    The visualized paranasal sinuses appear clear.    Sclerotic changes are seen involving left mastoid which is likely result   of underlying chronic inflammatory change.    Impression: No evidence of acute hemorrhage, mass or mass effect.

## 2018-10-31 NOTE — DISCHARGE NOTE ADULT - HOSPITAL COURSE
89F jackie speaking, with no significant PMHx presents with 1 day of dizziness and chronic abdominal pain. Patient with grandson at bedside who is providing translation. Patient states she was in bed today, and she developed dizziness and felt the room spinning around, and was unable to get herself out of bed since the dizziness. This episode prompted her family to bring her in. In addition, patient reports years of intermittent abdominal pain. She takes a medication for the pain, but her nor her grandson can recall the medication. She has some pain at this time most significantly in the b/l lower quadrants. Patient reports last BM ~3 days prior, and is continuing to pass gas. She states she feels constipated. Patient has been living in the [place] for > [place] years, however does not follow with a primary care doctor. Per son, she does not have any chronic medical problems. She takes a medication for her dentures and her abdominal pain, but cannot remember the name. Patient and grandon also deny any changes in dietary habits, or any weight gain or weight loss. In ED, VS /81; HR 80; RR20; O2 sat 100%on room air, afebrile at 97.2F. She underwent CT A/P which did not reveal any intra-abdominal pathology, but did reveal T12/L1 compression fractures. Per grandson, patient did have a fall few months prior in the bathroom at night, and the next morning, did not have any deficits and was able to ambulate at baseline.     CT Head was negative was acute pathology. CT abdomen/pelvis found a 6 mm right middle lobe solid pulmonary nodule and age indeterminate compression fractures at T12 and L2. Katie Hallpike was negative. BPs improved with hydralazine 10 TID, and sodium improved from 124 to 130 with 500cc of IV fluids. The pt was found to have likely hypovolemic hyponatremia which was likely contributing to her state. The patient improved and per family member at bedside, the patient is at baseline.    The pt was instructed to follow up outpatient for an incidental 6mm right middle lobe nodule for which noncontrast chest CT in 6 months is recommended.

## 2018-10-31 NOTE — PROGRESS NOTE ADULT - ATTENDING COMMENTS
Patient seen and examined. Chart/lab reviewed. Agree with above with modifications.  89F with no reported PMHx, p/w dizziness, abdominal pain, found to have hyponatremia and vertigo.  Pt feels better today and ambulated to the bathroom. She takes two meds from [employer] [place], reports it's for her abdomen.   PE: elderly frail woman in no distress, lung clear, heart regular, no leg edema; neuro: a/ox3, kyphosis  Assessment/plan:   # Hyponatremia: improved with NS hydration (serum Na up from 124 to 130), will give more NS, repeat BMp this pm, if continues to trend up will discharge pt home today  # Uncontrolled HTN: better controlled, increase hydralazine to 10 mg tid  # dizziness, vertigo: maybe compounded by hyponatremia, meclizine prn, outpt f/u PCP, seen by PT, will d/c home with a rolling walker Patient seen and examined. Chart/lab reviewed. Agree with above with modifications.  89F with no reported PMHx, p/w dizziness, abdominal pain, found to have hyponatremia and vertigo.  Pt feels better today and ambulated to the bathroom. She takes two meds from [place], reports it's for her abdomen.   PE: elderly frail woman in no distress, lung clear, heart regular, no leg edema; neuro: a/ox3, kyphosis  Assessment/plan:   # Hyponatremia: improved with NS hydration (serum Na up from 124 to 130), will give more NS, repeat BMP this pm, if continues to trend up will discharge pt home today  # Uncontrolled HTN: better controlled, increase hydralazine to 10 mg tid  # dizziness, vertigo: maybe compounded by hyponatremia, meclizine prn, outpt f/u PCP, seen by PT, will d/c home with a rolling walker  Time spent on discharge 32 minutes coordinating discharge plan and discussing with patient and family. Patient seen and examined. Chart/lab reviewed. Agree with above with modifications.  89F with no reported PMHx, p/w dizziness, abdominal pain, found to have hyponatremia and vertigo.  Pt feels better today and ambulated to the bathroom. She takes two meds from [place], reports it's for her abdomen.   PE: elderly frail woman in no distress, lung clear, heart regular, no leg edema; neuro: a/ox3, kyphosis  Assessment/plan:   # Hyponatremia: improved with NS hydration (serum Na up from 124 to 130), will give more NS, repeat BMP this pm  # Uncontrolled HTN: better controlled, increase hydralazine to 10 mg tid  # dizziness, vertigo: maybe compounded by hyponatremia, meclizine prn, outpt f/u PCP, seen by PT, will d/c home with a rolling walker  # Dispo: d/c home if repeat Na trending up

## 2018-10-31 NOTE — PROGRESS NOTE ADULT - SUBJECTIVE AND OBJECTIVE BOX
Sadi Denney [phone]    INTERVAL HPI/OVERNIGHT EVENTS:  Afebrile, saturating well overnight. Pt appears to be at baseline per family at bedside. Family wants to go home. Pt continues to endorse intermittent abdominal pain and      MEDICATIONS  (STANDING):  hydrALAZINE 10 milliGRAM(s) Oral two times a day  influenza   Vaccine 0.5 milliLiter(s) IntraMuscular once  senna 2 Tablet(s) Oral at bedtime    MEDICATIONS  (PRN):  meclizine 12.5 milliGRAM(s) Oral every 12 hours PRN Dizziness  polyethylene glycol 3350 17 Gram(s) Oral two times a day PRN Constipation      Allergies:   Intolerances:     PAST MEDICAL & SURGICAL HISTORY:  Hypertension  H/O eye surgery  Fracture of denture      Vital Signs Last 24 Hrs  T(C): 36.8 (31 Oct 2018 06:36), Max: 36.9 (30 Oct 2018 20:48)  T(F): 98.2 (31 Oct 2018 06:36), Max: 98.4 (30 Oct 2018 20:48)  HR: 89 (31 Oct 2018 06:36) (80 - 89)  BP: 157/74 (31 Oct 2018 06:36) (131/60 - 186/91)  BP(mean): --  RR: 18 (31 Oct 2018 06:36) (17 - 20)  SpO2: 100% (31 Oct 2018 06:36) (98% - 100%)      In's and Outs:         PHYSICAL EXAMINATION:  GENERAL: The patient is awake and alert in no apparent distress.   HEENT: NCAT. EOMI. Mucous membranes are moist.  NECK: Supple. No JVD.  LUNGS: [x] Clear to auscultation b/l   [x] Unlabored breathing   [ ] Wheezing  [ ] Rales  [ ] Rhonchi  HEART: [x] Normal  [ ] Irregular rhythm  [ ] Tachycardia  [ ] Bradycardia   [ ] Systolic murmur  [ ] Diastolic murmur  [ ] Gallop   ABDOMEN: [x] Normal  [ ] Hard  [ ] Tender  [ ] Distended [ ] Bowel sounds  GENITOURINARY: [x] Normal  [ ] Incontinent   [ ] Oliguria/Anuria   [ ] [place]  EXTREMITIES: [x] Normal  [ ] Cyanosis  [ ] Edema  NEUROLOGIC: [x] Grossly intact  [ ] Focal neurologic deficits  SKIN: [x] Normal  [ ] Rash   [ ] Ulcers  Musculoskeletal:  [x] Normal   [ ] Generalized weakness  [ ] Bedbound      LABS:                        11.8   5.82  )-----------( 314      ( 30 Oct 2018 15:00 )             35.9     Hgb Trend: 11.8<--  10-31    130<L>  |  93<L>  |  6<L>  ----------------------------<  103<H>  4.2   |  28  |  0.64    Ca    9.3      31 Oct 2018 07:40    TPro  7.6  /  Alb  4.4  /  TBili  < 0.2<L>  /  DBili  x   /  AST  21  /  ALT  13  /  AlkPhos  117  10-30    Creatinine Trend: 0.64<--, 0.61<--, 0.63<--    Urinalysis Basic - ( 30 Oct 2018 15:10 )    Color: LIGHT YELLOW / Appearance: CLEAR / S.009 / pH: 7.0  Gluc: NEGATIVE / Ketone: NEGATIVE  / Bili: NEGATIVE / Urobili: NORMAL   Blood: NEGATIVE / Protein: NEGATIVE / Nitrite: NEGATIVE   Leuk Esterase: NEGATIVE / RBC: x / WBC x   Sq Epi: x / Non Sq Epi: x / Bacteria: x        CAPILLARY BLOOD GLUCOSE      POCT Blood Glucose.: 111 mg/dL (30 Oct 2018 14:34)      EKG:       MICROBIOLOGY:      Blood cultures:      RADIOLOGY & ADDITIONAL STUDIES: [ ] Reviewed by me Sadi Denney [phone]    INTERVAL HPI/OVERNIGHT EVENTS:  Afebrile, saturating well overnight. Pt appears to be at baseline per family at bedside. Family wants to go home. Pt continues to endorse intermittent abdominal pain and continues to feel dizzy including lightheaded (darkening of vision) and vertigo (room spinning).      MEDICATIONS  (STANDING):  hydrALAZINE 10 milliGRAM(s) Oral two times a day  influenza   Vaccine 0.5 milliLiter(s) IntraMuscular once  senna 2 Tablet(s) Oral at bedtime    MEDICATIONS  (PRN):  meclizine 12.5 milliGRAM(s) Oral every 12 hours PRN Dizziness  polyethylene glycol 3350 17 Gram(s) Oral two times a day PRN Constipation      Allergies:   Intolerances:     PAST MEDICAL & SURGICAL HISTORY:  Hypertension  H/O eye surgery  Fracture of denture      Vital Signs Last 24 Hrs  T(C): 36.8 (31 Oct 2018 06:36), Max: 36.9 (30 Oct 2018 20:48)  T(F): 98.2 (31 Oct 2018 06:36), Max: 98.4 (30 Oct 2018 20:48)  HR: 89 (31 Oct 2018 06:36) (80 - 89)  BP: 157/74 (31 Oct 2018 06:36) (131/60 - 186/91)  BP(mean): --  RR: 18 (31 Oct 2018 06:36) (17 - 20)  SpO2: 100% (31 Oct 2018 06:36) (98% - 100%)      In's and Outs:         PHYSICAL EXAMINATION:  GENERAL: The patient is awake and alert in no apparent distress.   HEENT: NCAT. EOMI. Mucous membranes are moist.  NECK: Supple. No JVD.  LUNGS: [x] Clear to auscultation b/l   [x] Unlabored breathing   [ ] Wheezing  [ ] Rales  [ ] Rhonchi  HEART: [x] Normal  [ ] Irregular rhythm  [ ] Tachycardia  [ ] Bradycardia   [ ] Systolic murmur  [ ] Diastolic murmur  [ ] Gallop   ABDOMEN: [x] Normal  [ ] Hard  [ ] Tender  [ ] Distended [ ] Bowel sounds  GENITOURINARY: [ ] Normal  [ ] Incontinent   [ ] Oliguria/Anuria   [ ] [place]  EXTREMITIES: [x] Normal  [ ] Cyanosis  [ ] Edema  NEUROLOGIC: [x] Grossly intact  [ ] Focal neurologic deficits  SKIN: [x] Normal  [ ] Rash   [ ] Ulcers  Musculoskeletal:  [x] Normal   [ ] Generalized weakness  [ ] Bedbound      LABS:                        11.8   5.82  )-----------( 314      ( 30 Oct 2018 15:00 )             35.9     Hgb Trend: 11.8<--  10-31    130<L>  |  93<L>  |  6<L>  ----------------------------<  103<H>  4.2   |  28  |  0.64    Ca    9.3      31 Oct 2018 07:40    TPro  7.6  /  Alb  4.4  /  TBili  < 0.2<L>  /  DBili  x   /  AST  21  /  ALT  13  /  AlkPhos  117  1030    Creatinine Trend: 0.64<--, 0.61<--, 0.63<--    Urinalysis Basic - ( 30 Oct 2018 15:10 )    Color: LIGHT YELLOW / Appearance: CLEAR / S.009 / pH: 7.0  Gluc: NEGATIVE / Ketone: NEGATIVE  / Bili: NEGATIVE / Urobili: NORMAL   Blood: NEGATIVE / Protein: NEGATIVE / Nitrite: NEGATIVE   Leuk Esterase: NEGATIVE / RBC: x / WBC x   Sq Epi: x / Non Sq Epi: x / Bacteria: x        CAPILLARY BLOOD GLUCOSE      POCT Blood Glucose.: 111 mg/dL (30 Oct 2018 14:34)      EKG:       MICROBIOLOGY:      Blood cultures:      RADIOLOGY & ADDITIONAL STUDIES: [ ] Reviewed by me

## 2018-10-31 NOTE — DISCHARGE NOTE ADULT - PLAN OF CARE
Normal blood pressures When you came to the hospital you had a high blood pressure which was treated with medications and was normal with medication. Your sodium level was low when you came to the hospital and improved when we gave you intravenous fluids. The sodium being low was likely due to poor oral intake You were dizzy likely due to poor oral intake and your electrolytes being abnormal. Please try to eat what you are able to tolerate. Please use a rolling walker when you walk to reduce the risk of falls. Please stand up slowly because your blood pressure can take some time to adjust to your movements. You came to the hospital with a chronic abdominal pain which is intermittent. We prescribed a 30 day course of a PPI for reflux (omeprazole). Please follow up with an outpatient doctor of your choice to further treat your abdominal pain (and please bring your list of medications). Your vitamin d levels are very low (9.8) Please take the vitamin d tablets prescribed to you once a week for four weeks and follow up with your PMD. Please start vitamin D over the counter tablets AFTER you have completed the 4 week course of high dose medication.

## 2018-10-31 NOTE — DISCHARGE NOTE ADULT - DURABLE MEDICAL EQUIPMENT AGENCY
[facility] surgical [place] [phone] to deliver Darius walker to hospital bedside.

## 2018-10-31 NOTE — DISCHARGE NOTE ADULT - MEDICATION SUMMARY - MEDICATIONS TO TAKE
I will START or STAY ON the medications listed below when I get home from the hospital:    Junior Geovanna Wagner  -- 1 each   -- Indication: For Need for prophylactic measure    meclizine 12.5 mg oral tablet  -- 1 tab(s) by mouth every 12 hours, As needed, Dizziness  -- Indication: For Vertigo    senna oral tablet  -- 2 tab(s) by mouth once a day (at bedtime)  -- Indication: For Constipation    omeprazole 40 mg oral delayed release capsule  -- 1 cap(s) by mouth once a day   -- It is very important that you take or use this exactly as directed.  Do not skip doses or discontinue unless directed by your doctor.  Obtain medical advice before taking any non-prescription drugs as some may affect the action of this medication.  Swallow whole.  Do not crush.    -- Indication: For Gastroesophageal reflux    hydrALAZINE 10 mg oral tablet  -- 1 tab(s) by mouth 3 times a day  -- Indication: For Hypertension I will START or STAY ON the medications listed below when I get home from the hospital:    Junior Geovanna Wagner  -- 1 each   -- Indication: For Need for prophylactic measure    meclizine 12.5 mg oral tablet  -- 1 tab(s) by mouth every 12 hours, As needed, Dizziness  -- Indication: For Vertigo    senna oral tablet  -- 2 tab(s) by mouth once a day (at bedtime)  -- Indication: For Constipation    Sodium Chloride 1 g oral tablet  -- 1 tab(s) by mouth 3 times a day  -- Indication: For Hyponatremia    omeprazole 40 mg oral delayed release capsule  -- 1 cap(s) by mouth once a day   -- It is very important that you take or use this exactly as directed.  Do not skip doses or discontinue unless directed by your doctor.  Obtain medical advice before taking any non-prescription drugs as some may affect the action of this medication.  Swallow whole.  Do not crush.    -- Indication: For Gastroesophageal reflux    hydrALAZINE 10 mg oral tablet  -- 1 tab(s) by mouth 3 times a day  -- Indication: For Hypertension    Drisdol 50,000 intl units (1.25 mg) oral capsule  -- 1 cap(s) by mouth once a week  -- Indication: For Low Vitamin D

## 2018-10-31 NOTE — DISCHARGE NOTE ADULT - CARE PLAN
Principal Discharge DX:	Hypertensive emergency  Goal:	Normal blood pressures  Assessment and plan of treatment:	When you came to the hospital you had a high blood pressure which was treated with medications and was normal with medication.  Secondary Diagnosis:	Hyponatremia  Assessment and plan of treatment:	Your sodium level was low when you came to the hospital and improved when we gave you intravenous fluids. The sodium being low was likely due to poor oral intake  Secondary Diagnosis:	Dizziness  Assessment and plan of treatment:	You were dizzy likely due to poor oral intake and your electrolytes being abnormal. Please try to eat what you are able to tolerate. Please use a rolling walker when you walk to reduce the risk of falls. Please stand up slowly because your blood pressure can take some time to adjust to your movements.  Secondary Diagnosis:	Epigastric pain  Assessment and plan of treatment:	You came to the hospital with a chronic abdominal pain which is intermittent. We prescribed a 30 day course of a PPI for reflux (omeprazole). Please follow up with an outpatient doctor of your choice to further treat your abdominal pain (and please bring your list of medications). Principal Discharge DX:	Hypertensive emergency  Goal:	Normal blood pressures  Assessment and plan of treatment:	When you came to the hospital you had a high blood pressure which was treated with medications and was normal with medication.  Secondary Diagnosis:	Hyponatremia  Assessment and plan of treatment:	Your sodium level was low when you came to the hospital and improved when we gave you intravenous fluids. The sodium being low was likely due to poor oral intake  Secondary Diagnosis:	Dizziness  Assessment and plan of treatment:	You were dizzy likely due to poor oral intake and your electrolytes being abnormal. Please try to eat what you are able to tolerate. Please use a rolling walker when you walk to reduce the risk of falls. Please stand up slowly because your blood pressure can take some time to adjust to your movements.  Secondary Diagnosis:	Epigastric pain  Assessment and plan of treatment:	You came to the hospital with a chronic abdominal pain which is intermittent. We prescribed a 30 day course of a PPI for reflux (omeprazole). Please follow up with an outpatient doctor of your choice to further treat your abdominal pain (and please bring your list of medications).  Secondary Diagnosis:	Vitamin D deficiency  Assessment and plan of treatment:	Your vitamin d levels are very low (9.8) Please take the vitamin d tablets prescribed to you once a week for four weeks and follow up with your PMD. Please start vitamin D over the counter tablets AFTER you have completed the 4 week course of high dose medication.

## 2018-11-01 VITALS
HEART RATE: 107 BPM | DIASTOLIC BLOOD PRESSURE: 69 MMHG | OXYGEN SATURATION: 97 % | TEMPERATURE: 98 F | SYSTOLIC BLOOD PRESSURE: 139 MMHG | RESPIRATION RATE: 18 BRPM

## 2018-11-01 LAB
BUN SERPL-MCNC: 7 MG/DL — SIGNIFICANT CHANGE UP (ref 7–23)
CALCIUM SERPL-MCNC: 8.8 MG/DL — SIGNIFICANT CHANGE UP (ref 8.4–10.5)
CHLORIDE SERPL-SCNC: 96 MMOL/L — LOW (ref 98–107)
CO2 SERPL-SCNC: 22 MMOL/L — SIGNIFICANT CHANGE UP (ref 22–31)
CREAT SERPL-MCNC: 0.64 MG/DL — SIGNIFICANT CHANGE UP (ref 0.5–1.3)
GLUCOSE SERPL-MCNC: 99 MG/DL — SIGNIFICANT CHANGE UP (ref 70–99)
MAGNESIUM SERPL-MCNC: 2.2 MG/DL — SIGNIFICANT CHANGE UP (ref 1.6–2.6)
PHOSPHATE SERPL-MCNC: 4.4 MG/DL — SIGNIFICANT CHANGE UP (ref 2.5–4.5)
POTASSIUM SERPL-MCNC: 4.6 MMOL/L — SIGNIFICANT CHANGE UP (ref 3.5–5.3)
POTASSIUM SERPL-SCNC: 4.6 MMOL/L — SIGNIFICANT CHANGE UP (ref 3.5–5.3)
SODIUM SERPL-SCNC: 130 MMOL/L — LOW (ref 135–145)

## 2018-11-01 PROCEDURE — 99239 HOSP IP/OBS DSCHRG MGMT >30: CPT

## 2018-11-01 RX ORDER — SODIUM CHLORIDE 9 MG/ML
1 INJECTION INTRAMUSCULAR; INTRAVENOUS; SUBCUTANEOUS
Qty: 15 | Refills: 0
Start: 2018-11-01 | End: 2018-11-05

## 2018-11-01 RX ORDER — ERGOCALCIFEROL 1.25 MG/1
1 CAPSULE ORAL
Qty: 4 | Refills: 0
Start: 2018-11-01 | End: 2018-11-30

## 2018-11-01 RX ADMIN — Medication 10 MILLIGRAM(S): at 05:21

## 2018-11-01 RX ADMIN — Medication 10 MILLIGRAM(S): at 13:04

## 2018-11-01 RX ADMIN — ERGOCALCIFEROL 50000 UNIT(S): 1.25 CAPSULE ORAL at 11:30

## 2018-11-01 RX ADMIN — INFLUENZA VIRUS VACCINE 0.5 MILLILITER(S): 15; 15; 15; 15 SUSPENSION INTRAMUSCULAR at 15:38

## 2018-11-01 RX ADMIN — SODIUM CHLORIDE 1 GRAM(S): 9 INJECTION INTRAMUSCULAR; INTRAVENOUS; SUBCUTANEOUS at 13:04

## 2018-11-01 RX ADMIN — PANTOPRAZOLE SODIUM 40 MILLIGRAM(S): 20 TABLET, DELAYED RELEASE ORAL at 06:30

## 2018-11-01 RX ADMIN — ENOXAPARIN SODIUM 40 MILLIGRAM(S): 100 INJECTION SUBCUTANEOUS at 11:30

## 2018-11-01 RX ADMIN — SODIUM CHLORIDE 1 GRAM(S): 9 INJECTION INTRAMUSCULAR; INTRAVENOUS; SUBCUTANEOUS at 05:21

## 2018-11-01 NOTE — PROGRESS NOTE ADULT - ASSESSMENT
89F with no reported PMHx, presents with one episode of dizziness and chronic abdominal pain, found to have hypo-osmolar hyponatremia, and hypertensive emergency. Now BP is improved however pt continues to have dizziness. Has not received meclizine.
89F with no reported PMHx, presents with one episode of dizziness and chronic abdominal pain, found to have hypo-osmolar hyponatremia, and hypertensive emergency. Now BP is improved however pt continues to have dizziness.

## 2018-11-01 NOTE — PROGRESS NOTE ADULT - PROBLEM SELECTOR PLAN 1
- patient with Na of 124; and serum osm of 263 indicated hypo-osmolar hyponatremia, in setting of hypovolemia as improved with fluid resuscitation  - on examination, patient appears euvolemic thus ddx includes SIADH, hypothyroids, or medication induced (as unclear at this time what patient takes at home)  - urine studies c/w SIADH; repeat BMP s/p 500cc LR bolus in the ED showed improvement after IVF ->unclear etiology  - Will fluid restrict and c/w salt tabs.  - free T4 wnl  - Will repeat BMP   - CTH without acute intracranial pathology.
- patient with Na of 124; and serum osm of 263 indicated hypo-osmolar hyponatremia, in setting of hypovolemia as improved with fluid resuscitation  - on examination, patient appears euvolemic thus ddx includes SIADH, hypothyroids, or medication induced (as unclear at this time what patient takes at home)  - urine studies c/w SIADH; repeat BMP s/p 500cc LR bolus in the ED showed improvement after IVF ->unclear etiology  - free T4 wnl  - Will repeat BMP   - CTH without acute intracranial pathology.

## 2018-11-01 NOTE — PROGRESS NOTE ADULT - PROBLEM SELECTOR PLAN 3
recurrent this AM, unclear etiology; thought to be secondary to poorly controlled hypertension however this AM BP better controlled  trial of meclizine  Katie-Hallpike negative  fall precautions  CT head negative
recurrent this AM, unclear etiology; thought to be secondary to poorly controlled hypertension however this AM BP better controlled  trial of meclizine  would perform Katie-Hallpike when family returns to interpret (patient having difficulty following commands from  via phone)  fall precautions, PT consult  CT head negative

## 2018-11-01 NOTE — PROGRESS NOTE ADULT - PROBLEM SELECTOR PLAN 4
- chronic abdominal pain likely in setting of constipation (CT A/P revealing marked stool  - start on bowel regimen with miralax and senna- if no response, can try enema
- chronic abdominal pain likely in setting of constipation (CT A/P revealing marked stool  - start on bowel regimen with miralax and senna- if no response, can try enema

## 2018-11-01 NOTE — PROGRESS NOTE ADULT - PROBLEM SELECTOR PLAN 2
- patient presenting with BP of 180/81 up to 186/91 with symptoms of dizziness and blurred vision  - BP down to systolic 140-150s.  - will start anti-hypertensive medication-; Cr clearance 48; will start low dose hydralazine and titrate medication
- patient presenting with BP of 180/81 up to 186/91 with symptoms of dizziness and blurred vision  - BP down to systolic 140-150s.  - will start anti-hypertensive medication-; Cr clearance 48; will start low dose hydralazine and titrate medication

## 2018-11-01 NOTE — PROGRESS NOTE ADULT - SUBJECTIVE AND OBJECTIVE BOX
Sadi Denney [phone]    INTERVAL HPI/OVERNIGHT EVENTS:  Afebrile, saturating well overnight. No acute overnight events. Pt received NaCl tab x1 yesterday. BPs continue to remain well controlled. The pt continues to endorse vertiginous symptoms. Has not received meclizine in last 24h.      MEDICATIONS  (STANDING):  enoxaparin Injectable 40 milliGRAM(s) SubCutaneous daily  ergocalciferol 14579 Unit(s) Oral every week  hydrALAZINE 10 milliGRAM(s) Oral three times a day  influenza   Vaccine 0.5 milliLiter(s) IntraMuscular once  pantoprazole    Tablet 40 milliGRAM(s) Oral before breakfast  senna 2 Tablet(s) Oral at bedtime  sodium chloride 1 Gram(s) Oral three times a day    MEDICATIONS  (PRN):  meclizine 12.5 milliGRAM(s) Oral every 12 hours PRN Dizziness  polyethylene glycol 3350 17 Gram(s) Oral two times a day PRN Constipation      Allergies:   Intolerances:     PAST MEDICAL & SURGICAL HISTORY:  Hypertension  H/O eye surgery  Fracture of denture      Vital Signs Last 24 Hrs  T(C): 36.9 (2018 05:19), Max: 36.9 (31 Oct 2018 13:17)  T(F): 98.4 (2018 05:19), Max: 98.4 (31 Oct 2018 13:17)  HR: 92 (2018 05:19) (76 - 94)  BP: 111/62 (2018 05:19) (111/62 - 142/84)  BP(mean): --  RR: 17 (2018 05:19) (16 - 18)  SpO2: 98% (2018 05:19) (98% - 98%)      In's and Outs:       PHYSICAL EXAMINATION:  GENERAL: The patient is awake and alert in no apparent distress.   HEENT: NCAT. EOMI. Mucous membranes are dry.  NECK: Supple. No JVD.  LUNGS: [x] Clear to auscultation b/l   [x] Unlabored breathing   [ ] Wheezing  [ ] Rales  [ ] Rhonchi  HEART: [x] Normal  [ ] Irregular rhythm  [ ] Tachycardia  [ ] Bradycardia   [ ] Systolic murmur  [ ] Diastolic murmur  [ ] Gallop   ABDOMEN: [x] Normal  [ ] Hard  [ ] Tender  [ ] Distended [ ] Bowel sounds  GENITOURINARY: [ ] Normal  [ ] Incontinent   [ ] Oliguria/Anuria   [ ] [place]  EXTREMITIES: [x] Normal  [ ] Cyanosis  [ ] Edema  NEUROLOGIC: [x] Grossly intact  [ ] Focal neurologic deficits  SKIN: [x] Normal  [ ] Rash   [ ] Ulcers  Musculoskeletal:  [x] Normal   [ ] Generalized weakness  [ ] Bedbound      LABS:                        11.8   5.82  )-----------( 314      ( 30 Oct 2018 15:00 )             35.9     Hgb Trend: 11.8<--  11    130<L>  |  96<L>  |  7   ----------------------------<  99  4.6   |  22  |  0.64    Ca    8.8      2018 05:40  Phos  4.4       Mg     2.2         TPro  7.6  /  Alb  4.4  /  TBili  < 0.2<L>  /  DBili  x   /  AST  21  /  ALT  13  /  AlkPhos  117  10-30    Creatinine Trend: 0.64<--, 0.62<--, 0.64<--, 0.61<--, 0.63<--    Urinalysis Basic - ( 30 Oct 2018 15:10 )    Color: LIGHT YELLOW / Appearance: CLEAR / S.009 / pH: 7.0  Gluc: NEGATIVE / Ketone: NEGATIVE  / Bili: NEGATIVE / Urobili: NORMAL   Blood: NEGATIVE / Protein: NEGATIVE / Nitrite: NEGATIVE   Leuk Esterase: NEGATIVE / RBC: x / WBC x   Sq Epi: x / Non Sq Epi: x / Bacteria: x        MICROBIOLOGY:      Blood cultures:

## 2018-11-01 NOTE — PROGRESS NOTE ADULT - PROBLEM SELECTOR PLAN 5
T12/L2 age-indeterminate compression fractures incidentally noted on CT  Reports only abdominal pain  fall precautions  - Low vitamin D, will need supplementation
T12/L2 age-indeterminate compression fractures incidentally noted on CT  Reports only abdominal pain  fall precautions, Vit D level

## 2018-11-01 NOTE — PROGRESS NOTE ADULT - ATTENDING COMMENTS
Patient seen and examined. Chart/lab reviewed. Agree with above with modifications.  89F with no reported PMHx, p/w dizziness, abdominal pain, found to have hyponatremia and vertigo.  PE: elderly frail woman in no distress, lung clear, heart regular, no leg edema; neuro: a/ox3, kyphosis    Assessment/plan:   # Hyponatremia: Na 130, fluid restriction, d/c home on salt tabs x5 days, f/u PCP   # HTN: now controlled on hydralazine to 10 mg tid  # dizziness, vertigo: maybe compounded by hyponatremia, meclizine prn, outpt f/u PCP, seen by PT, d/c home with a rolling walker  # Dispo: d/c home today  Time spent on discharge 32 minutes coordinating discharge plan and discussing with patient and family.

## 2019-01-18 NOTE — PHYSICAL THERAPY INITIAL EVALUATION ADULT - DISCHARGE PLANNER MADE AWARE
[General Appearance - Alert] : alert [General Appearance - Well Nourished] : well nourished [General Appearance - Well Developed] : well developed [Sclera] : the sclera and conjunctiva were normal [Oropharynx] : the oropharynx was normal [Neck Appearance] : the appearance of the neck was normal [Thyroid Diffuse Enlargement] : the thyroid was not enlarged yes [Auscultation Breath Sounds / Voice Sounds] : lungs were clear to auscultation bilaterally [Heart Sounds] : normal S1 and S2 [Full Pulse] : the pedal pulses are present [Edema] : there was no peripheral edema [Abdomen Tenderness] : non-tender [Cervical Lymph Nodes Enlarged Anterior Bilaterally] : anterior cervical [Supraclavicular Lymph Nodes Enlarged Bilaterally] : supraclavicular [No Spinal Tenderness] : no spinal tenderness [Abnormal Walk] : normal gait [Nail Clubbing] : no clubbing  or cyanosis of the fingernails [Musculoskeletal - Swelling] : no joint swelling seen [Motor Tone] : muscle strength and tone were normal [FreeTextEntry1] : FROM all joints, bony changes hands and feet c/w OA, no synovitis noted, prominent MCP 2,3 b/l, left MCP synovitis has resolved.  [] : no rash [Deep Tendon Reflexes (DTR)] : deep tendon reflexes were 2+ and symmetric [Motor Exam] : the motor exam was normal [Oriented To Time, Place, And Person] : oriented to person, place, and time [Impaired Insight] : insight and judgment were intact

## 2019-07-01 ENCOUNTER — OUTPATIENT (OUTPATIENT)
Dept: OUTPATIENT SERVICES | Facility: HOSPITAL | Age: 84
LOS: 1 days | End: 2019-07-01
Payer: MEDICAID

## 2019-07-01 DIAGNOSIS — Z98.890 OTHER SPECIFIED POSTPROCEDURAL STATES: Chronic | ICD-10-CM

## 2019-07-01 DIAGNOSIS — K08.89 OTHER SPECIFIED DISORDERS OF TEETH AND SUPPORTING STRUCTURES: Chronic | ICD-10-CM

## 2019-07-01 PROCEDURE — G9001: CPT

## 2019-07-28 ENCOUNTER — INPATIENT (INPATIENT)
Facility: HOSPITAL | Age: 84
LOS: 1 days | Discharge: HOME | End: 2019-07-30
Attending: INTERNAL MEDICINE | Admitting: INTERNAL MEDICINE
Payer: MEDICAID

## 2019-07-28 VITALS
OXYGEN SATURATION: 99 % | DIASTOLIC BLOOD PRESSURE: 95 MMHG | TEMPERATURE: 98 F | RESPIRATION RATE: 17 BRPM | SYSTOLIC BLOOD PRESSURE: 186 MMHG | HEART RATE: 91 BPM

## 2019-07-28 DIAGNOSIS — K08.89 OTHER SPECIFIED DISORDERS OF TEETH AND SUPPORTING STRUCTURES: Chronic | ICD-10-CM

## 2019-07-28 DIAGNOSIS — E87.1 HYPO-OSMOLALITY AND HYPONATREMIA: ICD-10-CM

## 2019-07-28 DIAGNOSIS — S01.112S LACERATION WITHOUT FOREIGN BODY OF LEFT EYELID AND PERIOCULAR AREA, SEQUELA: ICD-10-CM

## 2019-07-28 DIAGNOSIS — R55 SYNCOPE AND COLLAPSE: ICD-10-CM

## 2019-07-28 DIAGNOSIS — Z98.890 OTHER SPECIFIED POSTPROCEDURAL STATES: Chronic | ICD-10-CM

## 2019-07-28 DIAGNOSIS — I10 ESSENTIAL (PRIMARY) HYPERTENSION: ICD-10-CM

## 2019-07-28 DIAGNOSIS — Z29.9 ENCOUNTER FOR PROPHYLACTIC MEASURES, UNSPECIFIED: ICD-10-CM

## 2019-07-28 DIAGNOSIS — S02.80XA FRACTURE OF OTHER SPECIFIED SKULL AND FACIAL BONES, UNSPECIFIED SIDE, INITIAL ENCOUNTER FOR CLOSED FRACTURE: ICD-10-CM

## 2019-07-28 LAB
ALBUMIN SERPL ELPH-MCNC: 4.1 G/DL — SIGNIFICANT CHANGE UP (ref 3.3–5)
ALP SERPL-CCNC: 107 U/L — SIGNIFICANT CHANGE UP (ref 40–120)
ALT FLD-CCNC: 13 U/L — SIGNIFICANT CHANGE UP (ref 4–33)
ANION GAP SERPL CALC-SCNC: 11 MMO/L — SIGNIFICANT CHANGE UP (ref 7–14)
ANION GAP SERPL CALC-SCNC: 15 MMO/L — HIGH (ref 7–14)
APPEARANCE UR: CLEAR — SIGNIFICANT CHANGE UP
AST SERPL-CCNC: 27 U/L — SIGNIFICANT CHANGE UP (ref 4–32)
BASOPHILS # BLD AUTO: 0.01 K/UL — SIGNIFICANT CHANGE UP (ref 0–0.2)
BASOPHILS NFR BLD AUTO: 0.2 % — SIGNIFICANT CHANGE UP (ref 0–2)
BILIRUB SERPL-MCNC: 0.3 MG/DL — SIGNIFICANT CHANGE UP (ref 0.2–1.2)
BILIRUB UR-MCNC: NEGATIVE — SIGNIFICANT CHANGE UP
BLOOD UR QL VISUAL: NEGATIVE — SIGNIFICANT CHANGE UP
BUN SERPL-MCNC: 5 MG/DL — LOW (ref 7–23)
BUN SERPL-MCNC: 5 MG/DL — LOW (ref 7–23)
CALCIUM SERPL-MCNC: 9 MG/DL — SIGNIFICANT CHANGE UP (ref 8.4–10.5)
CALCIUM SERPL-MCNC: 9.5 MG/DL — SIGNIFICANT CHANGE UP (ref 8.4–10.5)
CHLORIDE SERPL-SCNC: 89 MMOL/L — LOW (ref 98–107)
CHLORIDE SERPL-SCNC: 92 MMOL/L — LOW (ref 98–107)
CO2 SERPL-SCNC: 19 MMOL/L — LOW (ref 22–31)
CO2 SERPL-SCNC: 23 MMOL/L — SIGNIFICANT CHANGE UP (ref 22–31)
COLOR SPEC: COLORLESS — SIGNIFICANT CHANGE UP
CREAT ?TM UR-MCNC: 18.8 MG/DL — SIGNIFICANT CHANGE UP
CREAT SERPL-MCNC: 0.48 MG/DL — LOW (ref 0.5–1.3)
CREAT SERPL-MCNC: 0.54 MG/DL — SIGNIFICANT CHANGE UP (ref 0.5–1.3)
EOSINOPHIL # BLD AUTO: 0.23 K/UL — SIGNIFICANT CHANGE UP (ref 0–0.5)
EOSINOPHIL NFR BLD AUTO: 4.8 % — SIGNIFICANT CHANGE UP (ref 0–6)
GLUCOSE SERPL-MCNC: 116 MG/DL — HIGH (ref 70–99)
GLUCOSE SERPL-MCNC: 141 MG/DL — HIGH (ref 70–99)
GLUCOSE UR-MCNC: NEGATIVE — SIGNIFICANT CHANGE UP
HCT VFR BLD CALC: 34.5 % — SIGNIFICANT CHANGE UP (ref 34.5–45)
HGB BLD-MCNC: 10.9 G/DL — LOW (ref 11.5–15.5)
IMM GRANULOCYTES NFR BLD AUTO: 0.2 % — SIGNIFICANT CHANGE UP (ref 0–1.5)
KETONES UR-MCNC: NEGATIVE — SIGNIFICANT CHANGE UP
LEUKOCYTE ESTERASE UR-ACNC: NEGATIVE — SIGNIFICANT CHANGE UP
LYMPHOCYTES # BLD AUTO: 1.58 K/UL — SIGNIFICANT CHANGE UP (ref 1–3.3)
LYMPHOCYTES # BLD AUTO: 32.8 % — SIGNIFICANT CHANGE UP (ref 13–44)
MAGNESIUM SERPL-MCNC: 1.8 MG/DL — SIGNIFICANT CHANGE UP (ref 1.6–2.6)
MAGNESIUM SERPL-MCNC: 2 MG/DL — SIGNIFICANT CHANGE UP (ref 1.6–2.6)
MCHC RBC-ENTMCNC: 22.9 PG — LOW (ref 27–34)
MCHC RBC-ENTMCNC: 31.6 % — LOW (ref 32–36)
MCV RBC AUTO: 72.5 FL — LOW (ref 80–100)
MONOCYTES # BLD AUTO: 0.55 K/UL — SIGNIFICANT CHANGE UP (ref 0–0.9)
MONOCYTES NFR BLD AUTO: 11.4 % — SIGNIFICANT CHANGE UP (ref 2–14)
NEUTROPHILS # BLD AUTO: 2.44 K/UL — SIGNIFICANT CHANGE UP (ref 1.8–7.4)
NEUTROPHILS NFR BLD AUTO: 50.6 % — SIGNIFICANT CHANGE UP (ref 43–77)
NITRITE UR-MCNC: NEGATIVE — SIGNIFICANT CHANGE UP
NRBC # FLD: 0 K/UL — SIGNIFICANT CHANGE UP (ref 0–0)
OSMOLALITY SERPL: 261 MOSMO/KG — LOW (ref 275–295)
OSMOLALITY UR: 267 MOSMO/KG — SIGNIFICANT CHANGE UP (ref 50–1200)
PH UR: 7.5 — SIGNIFICANT CHANGE UP (ref 5–8)
PHOSPHATE SERPL-MCNC: 3.3 MG/DL — SIGNIFICANT CHANGE UP (ref 2.5–4.5)
PHOSPHATE SERPL-MCNC: 3.5 MG/DL — SIGNIFICANT CHANGE UP (ref 2.5–4.5)
PLATELET # BLD AUTO: 281 K/UL — SIGNIFICANT CHANGE UP (ref 150–400)
PMV BLD: 9.3 FL — SIGNIFICANT CHANGE UP (ref 7–13)
POTASSIUM SERPL-MCNC: 4.2 MMOL/L — SIGNIFICANT CHANGE UP (ref 3.5–5.3)
POTASSIUM SERPL-MCNC: 4.3 MMOL/L — SIGNIFICANT CHANGE UP (ref 3.5–5.3)
POTASSIUM SERPL-SCNC: 4.2 MMOL/L — SIGNIFICANT CHANGE UP (ref 3.5–5.3)
POTASSIUM SERPL-SCNC: 4.3 MMOL/L — SIGNIFICANT CHANGE UP (ref 3.5–5.3)
PROT SERPL-MCNC: 7.4 G/DL — SIGNIFICANT CHANGE UP (ref 6–8.3)
PROT UR-MCNC: 10 — SIGNIFICANT CHANGE UP
RBC # BLD: 4.76 M/UL — SIGNIFICANT CHANGE UP (ref 3.8–5.2)
RBC # FLD: 15.6 % — HIGH (ref 10.3–14.5)
SODIUM SERPL-SCNC: 123 MMOL/L — LOW (ref 135–145)
SODIUM SERPL-SCNC: 126 MMOL/L — LOW (ref 135–145)
SODIUM UR-SCNC: 97 MMOL/L — SIGNIFICANT CHANGE UP
SP GR SPEC: 1.01 — SIGNIFICANT CHANGE UP (ref 1–1.04)
TROPONIN T, HIGH SENSITIVITY: 9 NG/L — SIGNIFICANT CHANGE UP (ref ?–14)
TROPONIN T, HIGH SENSITIVITY: 9 NG/L — SIGNIFICANT CHANGE UP (ref ?–14)
TSH SERPL-MCNC: 1.85 UIU/ML — SIGNIFICANT CHANGE UP (ref 0.27–4.2)
UROBILINOGEN FLD QL: NORMAL — SIGNIFICANT CHANGE UP
WBC # BLD: 4.82 K/UL — SIGNIFICANT CHANGE UP (ref 3.8–10.5)
WBC # FLD AUTO: 4.82 K/UL — SIGNIFICANT CHANGE UP (ref 3.8–10.5)

## 2019-07-28 PROCEDURE — 72170 X-RAY EXAM OF PELVIS: CPT | Mod: 26

## 2019-07-28 PROCEDURE — 71250 CT THORAX DX C-: CPT | Mod: 26

## 2019-07-28 PROCEDURE — 99223 1ST HOSP IP/OBS HIGH 75: CPT | Mod: AI

## 2019-07-28 PROCEDURE — 72125 CT NECK SPINE W/O DYE: CPT | Mod: 26

## 2019-07-28 PROCEDURE — 70450 CT HEAD/BRAIN W/O DYE: CPT | Mod: 26

## 2019-07-28 PROCEDURE — 71045 X-RAY EXAM CHEST 1 VIEW: CPT | Mod: 26

## 2019-07-28 PROCEDURE — 70480 CT ORBIT/EAR/FOSSA W/O DYE: CPT | Mod: 26,59

## 2019-07-28 RX ORDER — SODIUM CHLORIDE 9 MG/ML
1000 INJECTION INTRAMUSCULAR; INTRAVENOUS; SUBCUTANEOUS
Refills: 0 | Status: DISCONTINUED | OUTPATIENT
Start: 2019-07-28 | End: 2019-07-28

## 2019-07-28 RX ORDER — ACETAMINOPHEN 500 MG
650 TABLET ORAL EVERY 6 HOURS
Refills: 0 | Status: DISCONTINUED | OUTPATIENT
Start: 2019-07-28 | End: 2019-07-28

## 2019-07-28 RX ORDER — SODIUM CHLORIDE 9 MG/ML
1 INJECTION INTRAMUSCULAR; INTRAVENOUS; SUBCUTANEOUS THREE TIMES A DAY
Refills: 0 | Status: DISCONTINUED | OUTPATIENT
Start: 2019-07-28 | End: 2019-07-30

## 2019-07-28 RX ORDER — ACETAMINOPHEN 500 MG
650 TABLET ORAL ONCE
Refills: 0 | Status: COMPLETED | OUTPATIENT
Start: 2019-07-28 | End: 2019-07-28

## 2019-07-28 RX ORDER — ACETAMINOPHEN 500 MG
1000 TABLET ORAL ONCE
Refills: 0 | Status: COMPLETED | OUTPATIENT
Start: 2019-07-28 | End: 2019-07-28

## 2019-07-28 RX ORDER — AMLODIPINE BESYLATE 2.5 MG/1
2.5 TABLET ORAL DAILY
Refills: 0 | Status: DISCONTINUED | OUTPATIENT
Start: 2019-07-29 | End: 2019-07-30

## 2019-07-28 RX ORDER — TETANUS TOXOID, REDUCED DIPHTHERIA TOXOID AND ACELLULAR PERTUSSIS VACCINE, ADSORBED 5; 2.5; 8; 8; 2.5 [IU]/.5ML; [IU]/.5ML; UG/.5ML; UG/.5ML; UG/.5ML
0.5 SUSPENSION INTRAMUSCULAR ONCE
Refills: 0 | Status: COMPLETED | OUTPATIENT
Start: 2019-07-28 | End: 2019-07-28

## 2019-07-28 RX ORDER — AMLODIPINE BESYLATE 2.5 MG/1
2.5 TABLET ORAL ONCE
Refills: 0 | Status: COMPLETED | OUTPATIENT
Start: 2019-07-28 | End: 2019-07-28

## 2019-07-28 RX ORDER — ERYTHROMYCIN BASE 5 MG/GRAM
1 OINTMENT (GRAM) OPHTHALMIC (EYE) THREE TIMES A DAY
Refills: 0 | Status: DISCONTINUED | OUTPATIENT
Start: 2019-07-28 | End: 2019-07-30

## 2019-07-28 RX ORDER — ACETAMINOPHEN 500 MG
650 TABLET ORAL EVERY 6 HOURS
Refills: 0 | Status: DISCONTINUED | OUTPATIENT
Start: 2019-07-28 | End: 2019-07-30

## 2019-07-28 RX ADMIN — TETANUS TOXOID, REDUCED DIPHTHERIA TOXOID AND ACELLULAR PERTUSSIS VACCINE, ADSORBED 0.5 MILLILITER(S): 5; 2.5; 8; 8; 2.5 SUSPENSION INTRAMUSCULAR at 03:09

## 2019-07-28 RX ADMIN — Medication 400 MILLIGRAM(S): at 11:06

## 2019-07-28 RX ADMIN — Medication 1 APPLICATION(S): at 20:48

## 2019-07-28 RX ADMIN — Medication 1 TABLET(S): at 13:11

## 2019-07-28 RX ADMIN — Medication 650 MILLIGRAM(S): at 20:48

## 2019-07-28 RX ADMIN — SODIUM CHLORIDE 75 MILLILITER(S): 9 INJECTION INTRAMUSCULAR; INTRAVENOUS; SUBCUTANEOUS at 12:35

## 2019-07-28 RX ADMIN — Medication 1 TABLET(S): at 17:04

## 2019-07-28 RX ADMIN — Medication 1000 MILLIGRAM(S): at 13:11

## 2019-07-28 RX ADMIN — AMLODIPINE BESYLATE 2.5 MILLIGRAM(S): 2.5 TABLET ORAL at 20:48

## 2019-07-28 RX ADMIN — SODIUM CHLORIDE 1 GRAM(S): 9 INJECTION INTRAMUSCULAR; INTRAVENOUS; SUBCUTANEOUS at 14:29

## 2019-07-28 RX ADMIN — SODIUM CHLORIDE 1 GRAM(S): 9 INJECTION INTRAMUSCULAR; INTRAVENOUS; SUBCUTANEOUS at 20:48

## 2019-07-28 RX ADMIN — Medication 1 APPLICATION(S): at 13:11

## 2019-07-28 RX ADMIN — Medication 650 MILLIGRAM(S): at 21:40

## 2019-07-28 NOTE — ED PROVIDER NOTE - PROGRESS NOTE DETAILS
Pt w/ hyponatremia to 123  EKG with few PVCs  Occuloplastics to suture eye laceration   will admit to Medicine/Tele

## 2019-07-28 NOTE — H&P ADULT - NEGATIVE MUSCULOSKELETAL SYMPTOMS
no arm pain L/no neck pain/no leg pain L/no back pain/no muscle weakness/no arm pain R/no leg pain R

## 2019-07-28 NOTE — H&P ADULT - ATTENDING COMMENTS
89F with fall and eyelid laceration, HTN, lung nodule admitted for syncope  --Telemetry monitoring, TTE  --start amlodipine for HTN  --hyponatremia with urine studies c/w SIADH, improved without IVF; ilberalize salt in diet, add salt tabs  --CT chest to f/u pulmonary nodule  --eyelid laceration care per ophthoplastics

## 2019-07-28 NOTE — H&P ADULT - PROBLEM SELECTOR PLAN 1
tele monitor   cardiac enzymes x 2: neg  Orthostatic blood pressures  Gentle IV fluids hydration w/ IV NS @ 75cc/hr  PT eval  TTE

## 2019-07-28 NOTE — CONSULT NOTE ADULT - SUBJECTIVE AND OBJECTIVE BOX
[facility] Ophthalmology Consult Note    Son at bedside, translating Gary.    HPI: 89-year-old F with no PMHx and POChx CEIOL OU who presents after fall around midnight with left lower eyelids laceration. Per son, patient got up to go to the bathroom, felt dizzy, fell and hit her face. No LOC. Patient denies any visual changes, no flashes/floaters, no double vision. Has pain around laceration area and above left superior periorbital area.    PMHx: None  Meds: None  POcHx (including surgeries/lasers/trauma):  CEIOL OD, OS? Wears glasses  Drops: None  FamHx: None  Social Hx: None  Allergies: NKDA    ROS:  General (neg), Vision (per HPI), Head and Neck (neg), Pulm (neg), CV (neg), GI (neg),  (neg), Musculoskeletal (neg), Skin/Integ (neg), Neuro (neg), Endocrine (neg), Heme (neg), All/Immuno (neg)    Mood and Affect Appropriate ( x ),  Oriented to Time, Place, and Person x 3 ( x )    Ophthalmology Exam    Visual acuity (cc near card, Tumbling E's): 20/70 OD, HM OS (PHNI OU)  Pupils: Round and reactive OD; irregular OS  Ttono: 9 OU  Extraocular movements (EOMs): Full OU, no pain, no diplopia   Confrontational Visual Field (CVF):  Full OU  Color Plates: SEBASTIAN 2/2 unable to read numbers OU    Pen Light Exam (PLE)  External:  Flat OD; left superior periorbital 1+ edema and trace ecchymosis  Lids/Lashes/Lacrimal Ducts: Flat OD; LLL medial laceration ~4 mm involving canalicular drainage system, eyelid margin, and avulsion of medial canthal ligament, possible laceration to septum  Sclera/Conjunctiva:  W+Q OD; subconjunctival hemorrhage nasal, inferior, and temporal, probed with CTA, anand negative  Cornea: Clear OD; central corneal scar OS, anand negative  Anterior Chamber: D+F OU  Iris:  Flat OD; irregular pupil OS  Lens:  PCIOL OD    Fundus Exam: dilated with 1% tropicamide and 2.5% phenylephrine  Approval obtained from primary team for dilation  Patient aware that pupils can remained dilated for at least 4-6 hours  Exam performed with 20D lens    Vitreous: wnl OU  Disc, cup/disc: sharp and pink, 0.4 OU  Macula:  wnl OU  Vessels:  wnl OU  Periphery: wnl OU    Diagnostic Testing: Pending CT      Assessment:      Plan:        Follow-Up:  Patient should follow up his/her ophthalmologist or in the [facility] Ophthalmology Practice within 1 week of discharge.  [address] [place] [address].  [place], [place] [id number] [phone] [facility] Ophthalmology Consult Note    Son at bedside, translating Gary.    HPI: 89-year-old F with no PMHx and POChx CEIOL OU who presents after fall around midnight with left lower eyelids laceration. Per son, patient got up to go to the bathroom, felt dizzy, fell and hit her face. No LOC. Patient denies any visual changes, no flashes/floaters, no double vision. Has pain around laceration area and above left superior periorbital area.    PMHx: None  Meds: None  POcHx (including surgeries/lasers/trauma):  CEIOL OU, wears glasses  Drops: None  FamHx: None  Social Hx: None  Allergies: NKDA    ROS:  General (neg), Vision (per HPI), Head and Neck (neg), Pulm (neg), CV (neg), GI (neg),  (neg), Musculoskeletal (neg), Skin/Integ (neg), Neuro (neg), Endocrine (neg), Heme (neg), All/Immuno (neg)    Mood and Affect Appropriate ( x ),  Oriented to Time, Place, and Person x 3 ( x )    Ophthalmology Exam    Visual acuity (cc near card, Tumbling E's): 20/70 OD, HM OS (PHNI OU)  Pupils: Round and reactive OD; irregular OS  Ttono: 9 OU  Extraocular movements (EOMs): Full OU, no pain, no diplopia   Confrontational Visual Field (CVF):  Full OU  Color Plates: SEBASTIAN 2/2 unable to read numbers OU    Pen Light Exam (PLE)  External:  Flat OD; left superior periorbital 1+ edema and trace ecchymosis  Lids/Lashes/Lacrimal Ducts: Flat OD; LLL medial laceration ~4 mm involving canalicular drainage system, eyelid margin, and avulsion of medial canthal ligament, possible laceration to septum with exposed adipose tissue  Sclera/Conjunctiva:  W+Q OD; subconjunctival hemorrhage nasal, inferior, and temporal, probed with CTA, anand negative  Cornea: Clear OD; central corneal scar with vascularization OS, anand negative  Anterior Chamber: D+F OU  Iris:  Flat OD; irregular pupil OS  Lens:  PCIOL OU    Fundus Exam: dilated with 1% tropicamide and 2.5% phenylephrine  Approval obtained from primary team for dilation  Patient aware that pupils can remained dilated for at least 4-6 hours  Exam performed with 20D and 28D lens    Vitreous: wnl OU  Disc, cup/disc: sharp and pink, 0.5 OD, SEBASTIAN OS 2/2 corneal scar obscuring view  Macula:  pigmentary changes OD; grossly flat OS although difficult view 2/2 corneal scar  Vessels:  wnl OU  Periphery: wnl OD; poor view OS 2/2 corneal scar but grossly flat    Diagnostic Testing: Pending CT      Assessment: 89-year-old F with no PMHx and POChx CEIOL OU who presents after fall around midnight with left lower eyelids laceration.  Anterior exam notable for left superior periorbital 1+ edema and trace ecchymosis  Lids/Lashes/Lacrimal Ducts: Flat OD; LLL medial laceration ~4 mm involving canalicular drainage system, eyelid margin, and avulsion of medial canthal ligament, possible laceration to septum with exposed adipose tissue; left subconjunctival hemorrhage nasal, inferior, and temporal, probed with CTA, anand negative; central large corneal scar with vascularization; irregular pupil OS; PCIOL OU. DFE grossly WNL OU however view partially obscured by central corneal scar OS.    Plan:  - consider tetanus prophylaxis if not up to date  - consider broad spectrum PO antibiotics x 7-10 days  - will suture lid bedside with oculoplastics attending    Follow-Up:  Patient should follow up his/her ophthalmologist or in the [facility] Ophthalmology Practice within 1 week of discharge.  [address] [facility] [place] [address] [phone] [facility] Ophthalmology Consult Note    Son at bedside, translating Gary.    HPI: 89-year-old F with no PMHx and POChx CEIOL OU who presents after fall around midnight with left lower eyelids laceration. Per son, patient got up to go to the bathroom, felt dizzy, fell and hit her face. No LOC. Patient denies any visual changes, no flashes/floaters, no double vision. Has pain around laceration area and above left superior periorbital area.    PMHx: None  Meds: None  POcHx (including surgeries/lasers/trauma):  CEIOL OU, wears glasses  Drops: None  FamHx: None  Social Hx: None. Lives with son.  Allergies: NKDA    ROS:  General (neg), Vision (per HPI), Head and Neck (neg), Pulm (neg), CV (neg), GI (neg),  (neg), Musculoskeletal (neg), Skin/Integ (neg), Neuro (neg), Endocrine (neg), Heme (neg), All/Immuno (neg)    Mood and Affect Appropriate ( x ),  Oriented to Time, Place, and Person x 3 ( x )    Ophthalmology Exam    Visual acuity (cc near card, Tumbling E's): 20/70 OD, HM OS (PHNI OU)  Pupils: Round and reactive OD; irregular OS  Ttono: 9 OU  Extraocular movements (EOMs): Full OU, no pain, no diplopia   Confrontational Visual Field (CVF):  Full OU  Color Plates: SEBASTIAN 2/2 unable to read numbers OU    Pen Light Exam (PLE)  External:  Flat OD; left superior periorbital 1+ edema and trace ecchymosis  Lids/Lashes/Lacrimal Ducts: Flat OD; LLL medial laceration ~4 mm involving canalicular drainage system, eyelid margin, and avulsion of medial canthal ligament, possible laceration to septum with exposed adipose tissue, also lateral canthal tendon avulsion; CHANTAL lateral margin involving ~1 mm laceration  Sclera/Conjunctiva:  W+Q OD; subconjunctival hemorrhage nasal, inferior, and temporal, probed with CTA, anand negative  Cornea: Clear OD; central corneal scar with vascularization OS, anand negative  Anterior Chamber: D+F OU  Iris:  Flat OD; irregular pupil OS  Lens:  PCIOL OU    Fundus Exam: dilated with 1% tropicamide and 2.5% phenylephrine  Approval obtained from primary team for dilation  Patient aware that pupils can remained dilated for at least 4-6 hours  Exam performed with 20D and 28D lens    Vitreous: wnl OU  Disc, cup/disc: sharp and pink, 0.5 OD, SEBASTIAN OS 2/2 corneal scar obscuring view  Macula:  pigmentary changes OD; grossly flat OS although difficult view 2/2 corneal scar  Vessels:  wnl OU  Periphery: wnl OD; poor view OS 2/2 corneal scar but grossly flat    Diagnostic Testing: Pending CT orbits    Assessment: 89-year-old F with no PMHx and POChx CEIOL OU who presents after fall around midnight with left lower eyelids laceration.  Anterior exam notable for left superior periorbital 1+ edema and trace ecchymosis  Lids/Lashes/Lacrimal Ducts: Flat OD; LLL medial laceration ~4 mm involving canalicular drainage system, eyelid margin, and avulsion of medial canthal ligament, possible laceration to septum with exposed adipose tissue; left subconjunctival hemorrhage nasal, inferior, and temporal, probed with CTA, anand negative; central large corneal scar with vascularization; irregular pupil OS; PCIOL OU. DFE grossly WNL OU however view partially obscured by central corneal scar OS.    Extensive left lower eyelid laceration with avulsion of the medial and lateral canthal tendons, two lacerations through the skin including one along inferior lid margin similar to a bleph incision and a medical vertical laceration.     Plan:  - lacerations repaired at bedside with 5-0 vicryl and running 5-0 fast. See procedure note authored by Dr. Johnston for details. No suture removal needed.  - consider tetanus prophylaxis if not up to date  - consider broad spectrum PO antibiotics x 7-10 days  - apply erythromycin eye ointment to the left upper and left lower lid TID x 10 days  - pending CT orbit    Follow-Up:  Patient should follow up with her ophthalmologist or in the [facility] Ophthalmology Practice within 1 week of discharge.  [address] [place] [address] [phone]    S/d/w Dr. Johnston (oculoplastics attending) [facility] Ophthalmology Consult Note    Grandson at bedside, translating Gary.    HPI: 89-year-old F with no PMHx and POChx CEIOL OU who presents after fall around midnight with left lower eyelids laceration. Per son, patient got up to go to the bathroom, felt dizzy, fell and hit her face. Unknown if hit her face on something. No LOC per grandson. Patient denies any visual changes, no flashes/floaters, no double vision. Has pain around laceration area and above left superior periorbital area.    PMHx: None  Meds: None  POcHx (including surgeries/lasers/trauma):  CEIOL OU, wears glasses  Drops: None  FamHx: None  Social Hx: None. Lives with son.  Allergies: NKDA    ROS:  General (neg), Vision (per HPI), Head and Neck (neg), Pulm (neg), CV (neg), GI (neg),  (neg), Musculoskeletal (neg), Skin/Integ (neg), Neuro (neg), Endocrine (neg), Heme (neg), All/Immuno (neg)    Mood and Affect Appropriate ( x ),  Oriented to Time, Place, and Person x 3 ( x )    Ophthalmology Exam    Visual acuity (cc near card, Tumbling E's): 20/70 OD, HM OS (PHNI OU)  Pupils: Round and reactive OD; irregular OS  Ttono: 9 OU  Extraocular movements (EOMs): Full OU, no pain, no diplopia   Confrontational Visual Field (CVF):  Full OD; SEBASTIAN OS 2/2 poor VA  Color Plates: SEBASTIAN 2/2 unable to read numbers OU    Pen Light Exam (PLE)  External:  Flat OD; left superior periorbital 1+ edema and trace ecchymosis  Lids/Lashes/Lacrimal Ducts: Flat OD; LLL medial laceration mm involving canalicular drainage system, eyelid margin, and avulsion of medial canthal ligament, possible laceration to septum with exposed adipose tissue, also lateral canthal tendon avulsion; CHANTAL lateral margin involving laceration  Sclera/Conjunctiva:  W+Q OD; subconjunctival hemorrhage nasal, inferior, and temporal, probed with CTA, anand negative  Cornea: Clear OD; central corneal scar with vascularization OS, anand negative  Anterior Chamber: D+F OU  Iris:  Flat OD; irregular pupil OS  Lens:  PCIOL OU    Fundus Exam: dilated with 1% tropicamide and 2.5% phenylephrine  Approval obtained from primary team for dilation  Patient aware that pupils can remained dilated for at least 4-6 hours  Exam performed with 20D and 28D lens    Vitreous: wnl OU  Disc, cup/disc: sharp and pink, 0.5 OD, SEBASTIAN OS 2/2 corneal scar obscuring view  Macula:  pigmentary changes OD; grossly flat OS although difficult view 2/2 corneal scar  Vessels:  wnl OU  Periphery: wnl OD; poor view OS 2/2 corneal scar but grossly flat    Diagnostic Testing: Pending CT orbits    Assessment: 89-year-old F with no PMHx and POChx CEIOL OU who presents after fall around midnight with left lower eyelids laceration.  Anterior exam notable for left superior periorbital 1+ edema and trace ecchymosis  Lids/Lashes/Lacrimal Ducts: Flat OD; LLL medial laceration involving canalicular drainage system, eyelid margin, and avulsion of medial canthal ligament, possible laceration to septum with exposed adipose tissue, avulsion of lower lateral canthal tendon, laceration of lateral CHANTAL; left subconjunctival hemorrhage nasal, inferior, and temporal, probed with CTA, anand negative; central large corneal scar with vascularization; irregular pupil OS; PCIOL OU. DFE grossly WNL OU however view partially obscured by central corneal scar OS.    Dx: Extensive left lower eyelid laceration with avulsion of the medial and lateral canthal tendons, two lacerations through the skin including one along inferior lid margin similar to a bleph incision and a medical vertical laceration.     Plan:  - Informed consent obtained from patient and grandson. Discussed that given patient's age., need for anesthesia, and dry eye, it was best not to repair tear duct. Lacerations repaired at bedside with 5-0 vicryl and running 5-0 fast. See progress/procedure note authored by Dr. Johnston for details  - no suture removal needed.  - consider tetanus prophylaxis if not up to date  - consider broad spectrum PO antibiotics x 7-10 days  - apply erythromycin eye ointment to the left upper and left lower lid TID x 10 days  - pending CT orbit    Follow-Up:  Patient should follow up with her ophthalmologist or in the [facility] Ophthalmology Practice within 1 week of discharge.  [address] [place] [id number]  [phone]    S/d/w Dr. Johnston (oculoplastics attending) [facility] Ophthalmology Consult Note    Grandson at bedside, translating Gary.    HPI: 89-year-old F with no PMHx and POChx CEIOL OU who presents after fall around midnight with left lower eyelids laceration. Per son, patient got up to go to the bathroom, felt dizzy, fell and hit her face. Unknown if hit her face on something. No LOC per grandson. Patient denies any visual changes, no flashes/floaters, no double vision. Has pain around laceration area and above left superior periorbital area.    PMHx: None  Meds: None  POcHx (including surgeries/lasers/trauma):  CEIOL OU, wears glasses  Drops: None  FamHx: None  Social Hx: None. Lives with son.  Allergies: NKDA    ROS:  General (neg), Vision (per HPI), Head and Neck (neg), Pulm (neg), CV (neg), GI (neg),  (neg), Musculoskeletal (neg), Skin/Integ (neg), Neuro (neg), Endocrine (neg), Heme (neg), All/Immuno (neg)    Mood and Affect Appropriate ( x ),  Oriented to Time, Place, and Person x 3 ( x )    Ophthalmology Exam    Visual acuity (cc near card, Tumbling E's): 20/70 OD, HM OS (PHNI OU)  Pupils: Round and reactive OD; irregular OS  Ttono: 9 OU  Extraocular movements (EOMs): Full OU, no pain, no diplopia   Confrontational Visual Field (CVF):  Full OD; SEBASTIAN OS 2/2 poor VA  Color Plates: SEBASTIAN 2/2 unable to read numbers OU    Pen Light Exam (PLE)  External:  Flat OD; left superior periorbital 1+ edema and trace ecchymosis  Lids/Lashes/Lacrimal Ducts: Flat OD; LLL medial laceration mm involving canalicular drainage system, eyelid margin, and avulsion of medial canthal ligament, possible laceration to septum with exposed adipose tissue, also lateral canthal tendon avulsion; CHANTAL lateral margin involving laceration  Sclera/Conjunctiva:  W+Q OD; subconjunctival hemorrhage nasal, inferior, and temporal, probed with CTA, anand negative  Cornea: Clear OD; central corneal scar with vascularization OS, anand negative  Anterior Chamber: D+F OU  Iris:  Flat OD; irregular pupil OS  Lens:  PCIOL OU    Fundus Exam: dilated with 1% tropicamide and 2.5% phenylephrine  Approval obtained from primary team for dilation  Patient aware that pupils can remained dilated for at least 4-6 hours  Exam performed with 20D and 28D lens    Vitreous: wnl OU  Disc, cup/disc: sharp and pink, 0.5 OD, SEBASTIAN OS 2/2 corneal scar obscuring view  Macula:  pigmentary changes OD; grossly flat OS although difficult view 2/2 corneal scar  Vessels:  wnl OU  Periphery: wnl OD; poor view OS 2/2 corneal scar but grossly flat    Diagnostic Testing:    < from: CT Orbit No Cont (07.28.19 @ 10:02) >    EXAM:  CT ORBITS        PROCEDURE DATE:  Jul 28 2019         INTERPRETATION:  INDICATIONS:  Fall. Left orbit trauma. Evaluate for   fracture.    TECHNIQUE: Thin section axial images were obtained without contrast using   multislice helical technique.  Reformatted coronal and sagittal images   were obtained.    COMPARISON:  None.    FINDINGS:    Globes:  Bilateral cataract surgery. Otherwise, globes are intact.   Optic Nerves:  Unremarkable.   Lacrimal Glands:  Unremarkable.   Extraocular Muscles:  Unremarkable.   Remaining Retrobulbar Space:  Unremarkable. No hematoma.   Visualized Intracranial Structures:   Unremarkable.   Visualized Sinuses:   Unremarkable.   Bones:  Non-displaced acute fracture of the anterior left lamina   papyracea.  Soft tissues: Left forehead/periorbital soft tissue hematoma. No   radiopaque foreign body.    IMPRESSION:   Left forehead/periorbital soft tissue hematoma. Non-displaced acute   fracture of the anterior left lamina papyracea. No radiopaque foreign   body. Intraorbital contents appear intact.    MARCELLE CARNES M.D., ATTENDING RADIOLOGIST  This document has been electronically signed. Jul 28 2019 10:27AM    < end of copied text >    Assessment: 89-year-old F with no PMHx and POChx CEIOL OU who presents after fall around midnight with left lower eyelids laceration.  Anterior exam notable for left superior periorbital 1+ edema and trace ecchymosis  Lids/Lashes/Lacrimal Ducts: Flat OD; LLL medial laceration involving canalicular drainage system, eyelid margin, and avulsion of medial canthal ligament, possible laceration to septum with exposed adipose tissue, avulsion of lower lateral canthal tendon, laceration of lateral CHANTAL; left subconjunctival hemorrhage nasal, inferior, and temporal, probed with CTA, anand negative; central large corneal scar with vascularization; irregular pupil OS; PCIOL OU. DFE grossly WNL OU however view partially obscured by central corneal scar OS. CT scan shows left forehead/periorbital soft tissue hematoma, nondisplaced fracture of anterior left lamina papyracea, globes intact.    Dx: Extensive left lower eyelid laceration with avulsion of the medial and lateral canthal tendons, two lacerations through the skin including one along inferior lid margin similar to a bleph incision and a medical vertical laceration.     Plan:  - Informed consent obtained from patient and grandson. Discussed that given patient's age., need for anesthesia, and dry eye, it was best not to repair tear duct. Lacerations repaired at bedside with 5-0 vicryl and running 5-0 fast. See progress/procedure note authored by Dr. Johnston for details  - no suture removal needed.  - consider tetanus prophylaxis if not up to date  - consider broad spectrum PO antibiotics x 7-10 days  - apply erythromycin eye ointment to the left upper and left lower lid TID x 10 days  - tylenol PRN pain    Follow-Up:  Patient should follow up with her ophthalmologist or in the [facility] Ophthalmology Practice within 1 week of discharge.  [address] [facility] [place] [address] [phone]    S/d/w Dr. Johnston (oculoplastics attending) [facility] Ophthalmology Consult Note    Grandson at bedside, translating Gary.    HPI: 89-year-old F with no PMHx and POChx CEIOL OU who presents after fall around midnight with left lower eyelids laceration. Per son, patient got up to go to the bathroom, felt dizzy, fell and hit her face. Unknown if hit her face on something. No LOC per grandson. Patient denies any visual changes, no flashes/floaters, no double vision. Has pain around laceration area and above left superior periorbital area.    PMHx: None  Meds: None  POcHx (including surgeries/lasers/trauma):  CEIOL OU, wears glasses  Drops: None  FamHx: None  Social Hx: None. Lives with son.  Allergies: NKDA    ROS:  General (neg), Vision (per HPI), Head and Neck (neg), Pulm (neg), CV (neg), GI (neg),  (neg), Musculoskeletal (neg), Skin/Integ (neg), Neuro (neg), Endocrine (neg), Heme (neg), All/Immuno (neg)    Mood and Affect Appropriate ( x ),  Oriented to Time, Place, and Person x 3 ( x )    Ophthalmology Exam    Visual acuity (cc near card, Tumbling E's): 20/70 OD, HM OS (PHNI OU)  Pupils: Round and reactive OD; irregular OS  Ttono: 9 OU  Extraocular movements (EOMs): Full OU, no pain, no diplopia   Confrontational Visual Field (CVF):  Full OD; SEBASTIAN OS 2/2 poor VA  Color Plates: SEBASTIAN 2/2 unable to read numbers OU    Pen Light Exam (PLE)  External:  Flat OD; left superior periorbital 1+ edema and trace ecchymosis  Lids/Lashes/Lacrimal Ducts: Flat OD; LLL medial laceration mm involving canalicular drainage system, eyelid margin, and avulsion of medial canthal ligament, possible laceration to septum with exposed adipose tissue, also lateral canthal tendon avulsion; CHANTAL lateral margin involving laceration  Sclera/Conjunctiva:  W+Q OD; subconjunctival hemorrhage nasal, inferior, and temporal, probed with CTA, anand negative  Cornea: Clear OD; central corneal scar with vascularization OS, anand negative  Anterior Chamber: D+F OU  Iris:  Flat OD; irregular pupil OS  Lens:  PCIOL OU    Fundus Exam: dilated with 1% tropicamide and 2.5% phenylephrine  Approval obtained from primary team for dilation  Patient aware that pupils can remained dilated for at least 4-6 hours  Exam performed with 20D and 28D lens    Vitreous: wnl OU  Disc, cup/disc: sharp and pink, 0.5 OD, SEBASTIAN OS 2/2 corneal scar obscuring view  Macula:  pigmentary changes OD; grossly flat OS although difficult view 2/2 corneal scar  Vessels:  wnl OU  Periphery: wnl OD; poor view OS 2/2 corneal scar but grossly flat    Diagnostic Testing:    < from: CT Orbit No Cont (07.28.19 @ 10:02) >    EXAM:  CT ORBITS        PROCEDURE DATE:  Jul 28 2019         INTERPRETATION:  INDICATIONS:  Fall. Left orbit trauma. Evaluate for   fracture.    TECHNIQUE: Thin section axial images were obtained without contrast using   multislice helical technique.  Reformatted coronal and sagittal images   were obtained.    COMPARISON:  None.    FINDINGS:    Globes:  Bilateral cataract surgery. Otherwise, globes are intact.   Optic Nerves:  Unremarkable.   Lacrimal Glands:  Unremarkable.   Extraocular Muscles:  Unremarkable.   Remaining Retrobulbar Space:  Unremarkable. No hematoma.   Visualized Intracranial Structures:   Unremarkable.   Visualized Sinuses:   Unremarkable.   Bones:  Non-displaced acute fracture of the anterior left lamina   papyracea.  Soft tissues: Left forehead/periorbital soft tissue hematoma. No   radiopaque foreign body.    IMPRESSION:   Left forehead/periorbital soft tissue hematoma. Non-displaced acute   fracture of the anterior left lamina papyracea. No radiopaque foreign   body. Intraorbital contents appear intact.    MARCELLE CARNES M.D., ATTENDING RADIOLOGIST  This document has been electronically signed. Jul 28 2019 10:27AM    < end of copied text >    Assessment: 89-year-old F with no PMHx and POChx CEIOL OU who presents after fall around midnight with left lower eyelids laceration.  Anterior exam notable for left superior periorbital 1+ edema and trace ecchymosis, LLL medial laceration involving canalicular drainage system, eyelid margin, and avulsion of medial canthal ligament, possible laceration to septum with exposed adipose tissue, avulsion of lower lateral canthal tendon, laceration of lateral CHANTAL; left subconjunctival hemorrhage nasal, inferior, and temporal, probed with CTA, anand negative; central large corneal scar with vascularization; irregular pupil OS; PCIOL OU. DFE grossly WNL OU however view partially obscured by central corneal scar OS. CT scan shows left forehead/periorbital soft tissue hematoma, nondisplaced fracture of anterior left lamina papyracea, globes intact.    Dx: Extensive left lower eyelid laceration with avulsion of the medial and lateral canthal tendons, two lacerations through the skin including one along inferior lid margin similar to a bleph incision and a medical vertical laceration.     Plan:  - Informed consent obtained from patient and grandson. Discussed that given patient's age., need for anesthesia, and dry eye, it was best not to repair tear duct. Lacerations repaired at bedside with 5-0 vicryl and running 5-0 fast. See progress/procedure note authored by Dr. Johnston for details  - no suture removal needed.  - consider tetanus prophylaxis if not up to date  - consider broad spectrum PO antibiotics x 7-10 days  - apply erythromycin eye ointment to the left upper and left lower lid TID x 10 days  - tylenol PRN pain  - patient states chronic poor vision OS, vision currently at baseline    Follow-Up:  Patient should follow up with her ophthalmologist or in the [facility] within 1 week of discharge.  [address] [facility] [place] [address] [phone]    S/d/w Dr. Johnston (oculoplastics attending)

## 2019-07-28 NOTE — H&P ADULT - NSICDXPASTSURGICALHX_GEN_ALL_CORE_FT
PAST SURGICAL HISTORY:  Fracture of denture     H/O eye surgery     History of intraocular lens exchange R eye

## 2019-07-28 NOTE — H&P ADULT - ASSESSMENT
89 yo F with no significant PMH (has no PCP) p/w dizziness and s/p unwitnessed fall, admitted to [facility] for syncope, r/o ACS, and hyponatremia.                      1Syncope  2. Hyponatremia  3. Left Lower Lid Laceration  s/p sutures placed by Ophthalmologist  4. Non-displaced acute fracture of the anterior left lamina papyracea  CT Orbit w/o con: Left forehead/periorbital soft tissue hematoma. Non-displaced acute fracture of the anterior left lamina papyracea. No radiopaque foreign body. Intraorbital contents appear intact.  -As per Ophthal recs: PO Augmentin 875/125mg 1 tab BID x7 days.

## 2019-07-28 NOTE — ED PROVIDER NOTE - ATTENDING CONTRIBUTION TO CARE
SATINDER VargasD: 89F no reported medical problems brought in by EMS after unwitnessed fall. pt states she was rushing to the bathroom and started to feel dizzy/lightheaded and fell forwards onto her face. denies LOC not on AC. able to get up and mabulate with assistance after fall. currently complaining of bleeding from left eye, but no change in vision. no cp, sob n/v prior to or after fall. unknown last tetanus    on exam-- agree with as documented by resident, notably no midline ctl spine tenderness, chest wall and pelvis stable, hematoma to left frontal forehead two laceraations to left lower eyelid one involving cannicular system and one across lid.    A/P: 89F presenting after unwitnessed fall, with preceeding dizziness/lightheadedness, concerning for syncope/nearsyncope, with external signs of head trauma. for syncope eval plan for labs, ekg, cxr, trop and tele monitoring. for fall to rule out any traumatic injury will check cthead, neck, cxr, pelvis xr. will update tetanus. will need ophtho eval as eye laceration involves tear drainage system. pt with cataracts, difficult to obtain visual exam, but states no change in vision, do not suspect retrobulbar hematoma.    plan for admission.

## 2019-07-28 NOTE — H&P ADULT - PROBLEM SELECTOR PLAN 4
House Ophthalmology consulted: As per Ophthal recs: started Erythromycin ophthalmic ointment apply to L upper and L lower eyelid TID

## 2019-07-28 NOTE — H&P ADULT - NSHPLABSRESULTS_GEN_ALL_CORE
EKG: Sinus Rhythm with occasional PVC @ 89 bpm  Trops: 9-->9  UA: neg  Head CT: Mildly motion degraded exam. Left frontal scalp hematoma. No   gross acute intracranial hemorrhage, mass effect, or displaced calvarial   fracture. Stable chronic findings.  Cervical spine CT: No evidence for acute displaced fracture or traumatic   malalignment. Cervical degenerative spondylosis, as described above.  CXR: Increased density in the left hemithorax may be secondary to technique   versus layering pleural effusion. No focal consolidation.  Na+: 123  Osm Urine: 267  CT Orbit w/o con: Left forehead/periorbital soft tissue hematoma. Non-displaced acute fracture of the anterior left lamina papyracea. No radiopaque foreign body. Intraorbital contents appear intact.

## 2019-07-28 NOTE — ED PROVIDER NOTE - OBJECTIVE STATEMENT
89 yo F with no significant PMH presenting with left eye laceration after fall. Pt's family states that she was sleeping in bed and awoke to use the restroom when she felt dizzy and fell and landed on her face. She denies loss of consciousness, persistent dizziness, or palpitations related to her fall. She denies any blurry vision/loss of vision in the left eye.

## 2019-07-28 NOTE — PATIENT PROFILE ADULT - LANGUAGE ASSISTANCE NEEDED
No-Patient/Caregiver offered and refused free interpretation services./Patient grandson to interpret for her

## 2019-07-28 NOTE — H&P ADULT - HISTORY OF PRESENT ILLNESS
89 yo F with no significant PMH (has no PCP) p/w dizziness and s/p unwitnessed fall last night. Pt woke at night, 12am, to go to the bathroom, became dizzy, lightheaded and fell on the floor, hitting her left forehead. Moy heard a "thud" sound in the other room. Came to the room and found pt on the floor, alert and oriented with laceration to her left lower eyelid. EMS was called and pt was taken to [facility] ED. Pt denies fever, chills, diaphoresis, chest pain, sob, palpitations, nausea, vomiting or abdominal pain.    On admission pt c/o L frontal headache, 7/10. In ED pt was seen by Ophthalmology and had multiple sutures placed on affected L eyelids.

## 2019-07-28 NOTE — ED PROVIDER NOTE - NS ED ROS FT
· CONSTITUTIONAL: no fever and no chills.  · CARDIOVASCULAR: no chest pain and no edema.  · RESPIRATORY: no chest pain, no cough, and no shortness of breath.  · GASTROINTESTINAL: no abdominal pain, no bloating, no constipation, no diarrhea, no nausea and no vomiting.  · GENITOURINARY: no dysuria, no frequency, and no hematuria.  · MUSCULOSKELETAL: no back pain, no gout, no musculoskeletal pain, no neck pain, and no weakness.  · NEURO: no loss of consciousness, no gait abnormality, no headache, no sensory deficits, and no weakness.

## 2019-07-28 NOTE — H&P ADULT - RS GEN PE MLT RESP DETAILS PC
respirations non-labored/no rhonchi/good air movement/clear to auscultation bilaterally/breath sounds equal/no wheezes/airway patent/no chest wall tenderness/no rales

## 2019-07-28 NOTE — H&P ADULT - NSHPLANGTRANSLATORFT_GEN_A_CORE
Pt refused  services and insisted Jesus Blum (grandson/caregiver) [phone] to interpret by bedside.

## 2019-07-28 NOTE — PATIENT PROFILE ADULT - VISION (WITH CORRECTIVE LENSES IF THE PATIENT USUALLY WEARS THEM):
Blurred vision left eye/Partially impaired: cannot see medication labels or newsprint, but can see obstacles in path, and the surrounding layout; can count fingers at arm's length

## 2019-07-28 NOTE — ED ADULT NURSE NOTE - OBJECTIVE STATEMENT
Gasper RN : A&Ox4 amb with cane 89 year old female presents to the ed today s/p witnessed fall patient was ambulating to bathroom when she tripped and fell left eye laceration present and bleeding controlled by gauze. ecchymosis and edema present on upper left eye brow. patient denies pain. primarily Gary speaking, family a bedside used for translate. PT hypertensive at this time and as per family does not take any HTN medications. PT breathing even and unlabored. 20G iv placed in left wrist. Awaiting OPTO eval

## 2019-07-28 NOTE — ED PROVIDER NOTE - CARE PLAN
Principal Discharge DX:	Eye laceration, left, initial encounter Principal Discharge DX:	Syncope  Secondary Diagnosis:	Eye laceration

## 2019-07-28 NOTE — ED ADULT NURSE NOTE - CHIEF COMPLAINT QUOTE
Pt c/o fall outside bathroom. Pt states she felt dizzy before falling. Pt noted to have large hematoma over left eye and jyothi abrasion under left eye. Left eye currently bleeding in triage. Pt denies visual changes in left eye. Pt denies dizziness, cp, loc, AC use.

## 2019-07-28 NOTE — H&P ADULT - PROBLEM SELECTOR PLAN 3
House Ophthalmology consulted: As per Ophthal recs: started PO Augmentin 875/125mg 1 tab BID x7 days.

## 2019-07-29 LAB
ANION GAP SERPL CALC-SCNC: 10 MMO/L — SIGNIFICANT CHANGE UP (ref 7–14)
BUN SERPL-MCNC: 7 MG/DL — SIGNIFICANT CHANGE UP (ref 7–23)
CALCIUM SERPL-MCNC: 9 MG/DL — SIGNIFICANT CHANGE UP (ref 8.4–10.5)
CHLORIDE SERPL-SCNC: 94 MMOL/L — LOW (ref 98–107)
CHOLEST SERPL-MCNC: 225 MG/DL — HIGH (ref 120–199)
CO2 SERPL-SCNC: 21 MMOL/L — LOW (ref 22–31)
CREAT SERPL-MCNC: 0.55 MG/DL — SIGNIFICANT CHANGE UP (ref 0.5–1.3)
GLUCOSE SERPL-MCNC: 98 MG/DL — SIGNIFICANT CHANGE UP (ref 70–99)
HDLC SERPL-MCNC: 83 MG/DL — HIGH (ref 45–65)
LIPID PNL WITH DIRECT LDL SERPL: 142 MG/DL — SIGNIFICANT CHANGE UP
POTASSIUM SERPL-MCNC: 4.6 MMOL/L — SIGNIFICANT CHANGE UP (ref 3.5–5.3)
POTASSIUM SERPL-SCNC: 4.6 MMOL/L — SIGNIFICANT CHANGE UP (ref 3.5–5.3)
SODIUM SERPL-SCNC: 125 MMOL/L — LOW (ref 135–145)
TRIGL SERPL-MCNC: 58 MG/DL — SIGNIFICANT CHANGE UP (ref 10–149)

## 2019-07-29 PROCEDURE — 93306 TTE W/DOPPLER COMPLETE: CPT | Mod: 26

## 2019-07-29 PROCEDURE — 99232 SBSQ HOSP IP/OBS MODERATE 35: CPT

## 2019-07-29 RX ADMIN — Medication 1 APPLICATION(S): at 21:32

## 2019-07-29 RX ADMIN — AMLODIPINE BESYLATE 2.5 MILLIGRAM(S): 2.5 TABLET ORAL at 05:57

## 2019-07-29 RX ADMIN — Medication 1 APPLICATION(S): at 05:57

## 2019-07-29 RX ADMIN — Medication 650 MILLIGRAM(S): at 11:30

## 2019-07-29 RX ADMIN — Medication 1 TABLET(S): at 05:57

## 2019-07-29 RX ADMIN — Medication 1 TABLET(S): at 17:36

## 2019-07-29 RX ADMIN — Medication 650 MILLIGRAM(S): at 10:59

## 2019-07-29 RX ADMIN — SODIUM CHLORIDE 1 GRAM(S): 9 INJECTION INTRAMUSCULAR; INTRAVENOUS; SUBCUTANEOUS at 21:32

## 2019-07-29 RX ADMIN — SODIUM CHLORIDE 1 GRAM(S): 9 INJECTION INTRAMUSCULAR; INTRAVENOUS; SUBCUTANEOUS at 15:22

## 2019-07-29 RX ADMIN — SODIUM CHLORIDE 1 GRAM(S): 9 INJECTION INTRAMUSCULAR; INTRAVENOUS; SUBCUTANEOUS at 05:57

## 2019-07-29 RX ADMIN — Medication 1 APPLICATION(S): at 15:22

## 2019-07-29 NOTE — PHYSICAL THERAPY INITIAL EVALUATION ADULT - CRITERIA FOR SKILLED THERAPEUTIC INTERVENTIONS
rehab potential/risk reduction/prevention/predicted duration of therapy intervention/impairments found/therapy frequency/anticipated discharge recommendation

## 2019-07-29 NOTE — PHYSICAL THERAPY INITIAL EVALUATION ADULT - PERTINENT HX OF CURRENT PROBLEM, REHAB EVAL
Pt. admitted for syncope. Per documentation, CT Orbit w/o con: Left forehead/periorbital soft tissue hematoma. Non-displaced acute fracture of the anterior left lamina papyracea. Per radiology reports, X-ray pelvis revealed no acute fracture. No dislocation. Mild arthrosis of the bilateral hips. CT cervical spine revealed no evidence for acute displaced fracture or traumatic malalignment.

## 2019-07-29 NOTE — CONSULT NOTE ADULT - SUBJECTIVE AND OBJECTIVE BOX
Gallo Vazquez MD  Interventional Cardiology   [place] Office : [address] [place] [address] [place]. [address]  Tel:   [place] Office : [address] [facility] [place] [address]  Tel: [phone]  Cell : [phone]    HISTORY OF PRESENT ILLNESS:     89 yo F with no significant PMH (has no PCP) p/w dizziness and s/p unwitnessed fall last night. Pt woke at night, 12am, to go to the bathroom, became dizzy, lightheaded and fell on the floor, hitting her left forehead. Grandson heard a "thud" sound in the other room. Came to the room and found pt on the floor, alert and oriented with laceration to her left lower eyelid. EMS was called and pt was taken to [facility] ED. Pt denies fever, chills, diaphoresis, chest pain, sob, palpitations, nausea, vomiting or abdominal pain.    On admission pt c/o L frontal headache, 7/10. In ED pt was seen by Ophthalmology and had multiple sutures placed on affected L eyelids.    PAST MEDICAL & SURGICAL HISTORY:  Blurry vision, left eye  Hypertension  History of intraocular lens exchange: R eye  H/O eye surgery  Fracture of denture    	    MEDICATIONS:  amLODIPine   Tablet 2.5 milliGRAM(s) Oral daily    amoxicillin  875 milliGRAM(s)/clavulanate 1 Tablet(s) Oral every 12 hours      acetaminophen   Tablet .. 650 milliGRAM(s) Oral every 6 hours PRN        erythromycin   Ointment 1 Application(s) Left EYE three times a day  sodium chloride 1 Gram(s) Oral three times a day      FAMILY HISTORY:  No pertinent family history in first degree relatives        Allergies    No Known Allergies    Intolerances    	      PHYSICAL EXAM:  T(C): 36.9 (07-29-19 @ 13:24), Max: 36.9 (07-29-19 @ 13:24)  HR: 78 (07-29-19 @ 13:24) (78 - 97)  BP: 133/71 (07-29-19 @ 13:24) (122/88 - 170/78)  RR: 17 (07-29-19 @ 13:24) (17 - 18)  SpO2: 98% (07-29-19 @ 13:24) (98% - 100%)  Wt(kg): --  I&O's Summary    28 Jul 2019 07:01  -  29 Jul 2019 07:00  --------------------------------------------------------  IN: 100 mL / OUT: 0 mL / NET: 100 mL        Appearance: Normal	  HEENT:   Left eye injury s/p sutures 	  Cardiovascular: Normal S1 S2, No JVD, No murmurs  Respiratory: Lungs clear to auscultation	  Gastrointestinal:  Soft, Non-tender, + BS	  Extremities: No clubbing, cyanosis or edema    LABS:	 	    CARDIAC MARKERS:                                  10.9   4.82  )-----------( 281      ( 28 Jul 2019 02:14 )             34.5     07-29    125<L>  |  94<L>  |  7   ----------------------------<  98  4.6   |  21<L>  |  0.55    Ca    9.0      29 Jul 2019 06:00  Phos  3.3     07-28  Mg     2.0     07-28    TPro  7.4  /  Alb  4.1  /  TBili  0.3  /  DBili  x   /  AST  27  /  ALT  13  /  AlkPhos  107  07-28    proBNP:   Lipid Profile:   HgA1c:   TSH:     ASSESSMENT/PLAN:

## 2019-07-29 NOTE — PROGRESS NOTE ADULT - ATTENDING COMMENTS
Patient fell early this am in her home and brought to the ED by her son, with whom she lives.  Extensive left lower eyelid laceration with avulsion of the medial and lateral canthal tendons, two lacerations thru skin including one along inferior lid margin similar to a bleph incision and a medial vertical laceration.  Tetracaine applied to the globe and betadine to the eyelids. 1% xylocaine with epinephrine injected into the lacerations approx 1.5 cc utilized.  Medial and lateral canthal tendons reapproximated with multiple 5-0 undyed vicryl sutures. Two lacerations ( one along lower  lid margin -skin junction approx. 2 cm closed with running 5-0 fast.  Vertical medial canthal laceration appox 1 cm closed with running 5-0 fast. Erythromycin ointment applied.  Tear duct laceration not repaired given her age, need for anesthesia, and her clinicaly appearing dry eye. Son is aware and agreed to such.
I have interviewed and examined the patient and reviewed the residents note including the history, exam, assessment, and plan.  I agree with the residents assessment and plan.    Grace Mackay MD

## 2019-07-29 NOTE — PROGRESS NOTE ADULT - ASSESSMENT
89 yo F with no significant PMH (has no PCP) p/w dizziness and s/p unwitnessed fall, admitted to [facility] for syncope, r/o ACS, and hyponatremia.                      1Syncope  2. Hyponatremia  3. Left Lower Lid Laceration  s/p sutures placed by Ophthalmologist  4. Non-displaced acute fracture of the anterior left lamina papyracea  CT Orbit w/o con: Left forehead/periorbital soft tissue hematoma. Non-displaced acute fracture of the anterior left lamina papyracea. No radiopaque foreign body. Intraorbital contents appear intact.  -As per Ophthal recs: PO Augmentin 875/125mg 1 tab BID x7 days.

## 2019-07-29 NOTE — PHYSICAL THERAPY INITIAL EVALUATION ADULT - ADDITIONAL COMMENTS
Pt. owns DME of straight cane, rolling walker. Patient's grandson reports at times, pt. requires assistance.     Pt. was left supine in bed post PT Evaluation, NAD, all lines intact, family present.

## 2019-07-29 NOTE — CONSULT NOTE ADULT - SUBJECTIVE AND OBJECTIVE BOX
[facility]   MD KAROLINA MAGANA MD ANGELA WONG, PA    TEL:  OFFICE: [phone]  DR COOL CELL: [phone]  LUIS STUART CELL: [phone]      HPI:  history from irvin at bedside  89 yo F with no significant PMH (has no PCP) p/w dizziness and s/p unwitnessed fall last night.  Irvin heard a "thud" sound in the other room. Came to the room and found pt on the floor, alert and oriented with laceration to her left lower eyelid. EMS was called and pt was taken to [facility] ED. Pt denies fever, chills, diaphoresis, chest pain, sob, palpitations, nausea, vomiting or abdominal pain.   nephrologist consulted for hyponatremia. per son patient has hx of low na on the last admission, was give IVF no follow up since. denied any poor appetite, no excessive fluid intake, no new medication, no n/v/d      Allergies:  No Known Allergies      PAST MEDICAL & SURGICAL HISTORY:  Blurry vision, left eye  Hypertension  History of intraocular lens exchange: R eye  H/O eye surgery  Fracture of denture      Home Medications Reviewed    Hospital Medications:   MEDICATIONS  (STANDING):  amLODIPine   Tablet 2.5 milliGRAM(s) Oral daily  amoxicillin  875 milliGRAM(s)/clavulanate 1 Tablet(s) Oral every 12 hours  erythromycin   Ointment 1 Application(s) Left EYE three times a day  sodium chloride 1 Gram(s) Oral three times a day      SOCIAL HISTORY:  Denies ETOh, Smoking,     FAMILY HISTORY:  No pertinent family history in first degree relatives      REVIEW OF SYSTEMS:  CONSTITUTIONAL: No weakness, fevers or chills  EYES/ENT: left eye laceration and pain  NECK: No pain or stiffness  RESPIRATORY: No cough, wheezing, hemoptysis; No shortness of breath  CARDIOVASCULAR: No chest pain or palpitations.  GASTROINTESTINAL: No abdominal or epigastric pain. No nausea, vomiting, or hematemesis; No diarrhea or constipation. No melena or hematochezia.  GENITOURINARY: No dysuria, frequency, foamy urine, urinary urgency, incontinence or hematuria  NEUROLOGICAL: No numbness or weakness  SKIN: No itching, burning, rashes, or lesions   VASCULAR: No bilateral lower extremity edema.   All other review of systems is negative unless indicated above.    VITALS:  T(F): 98.4 (19 @ 13:24), Max: 98.4 (19 @ 13:24)  HR: 78 (19 @ 13:24)  BP: 133/71 (19 @ 13:24)  RR: 17 (19 @ 13:24)  SpO2: 98% (19 @ 13:24)  Wt(kg): --     @ 07:01  -   @ 07:00  --------------------------------------------------------  IN: 100 mL / OUT: 0 mL / NET: 100 mL          PHYSICAL EXAM:  Constitutional: NAD  HEENT: left eye bruise, closed.   Neck: No JVD  Respiratory: CTAB, no wheezes, rales or rhonchi  Cardiovascular: S1, S2, RRR  Gastrointestinal: BS+, soft, slightly diffused tenderness  Extremities: No cyanosis or clubbing. No peripheral edema  Neurological: A/O x 3, no focal deficits  Psychiatric: Normal mood, normal affect  : No CVA tenderness. No kirk.   Skin: No rashes    LABS:      125<L>  |  94<L>  |  7   ----------------------------<  98  4.6   |  21<L>  |  0.55    Ca    9.0      2019 06:00  Phos  3.3       Mg     2.0         TPro  7.4  /  Alb  4.1  /  TBili  0.3  /  DBili      /  AST  27  /  ALT  13  /  AlkPhos  107      Creatinine Trend: 0.55 <--, 0.48 <--, 0.54 <--                        10.9   4.82  )-----------( 281      ( 2019 02:14 )             34.5     Urine Studies:  Urinalysis Basic - ( 2019 04:20 )    Color: COLORLESS / Appearance: CLEAR / S.008 / pH: 7.5  Gluc: NEGATIVE / Ketone: NEGATIVE  / Bili: NEGATIVE / Urobili: NORMAL   Blood: NEGATIVE / Protein: 10 / Nitrite: NEGATIVE   Leuk Esterase: NEGATIVE / RBC:  / WBC    Sq Epi:  / Non Sq Epi:  / Bacteria:       Sodium, Random Urine: 97 mmol/L ( @ 04:20)  Creatinine, Random Urine: 18.80 mg/dL ( @ 04:20)  Osmolality, Random Urine: 267 mosmo/kg ( @ 04:20)      RADIOLOGY & ADDITIONAL STUDIES:

## 2019-07-29 NOTE — PROGRESS NOTE ADULT - SUBJECTIVE AND OBJECTIVE BOX
SUBJECTIVE / OVERNIGHT EVENTS: pt seen and examined, pts family next to pts bed       MEDICATIONS  (STANDING):  amLODIPine   Tablet 2.5 milliGRAM(s) Oral daily  amoxicillin  875 milliGRAM(s)/clavulanate 1 Tablet(s) Oral every 12 hours  erythromycin   Ointment 1 Application(s) Left EYE three times a day  sodium chloride 1 Gram(s) Oral three times a day    MEDICATIONS  (PRN):  acetaminophen   Tablet .. 650 milliGRAM(s) Oral every 6 hours PRN Temp greater or equal to 38C (100.4F), Mild Pain (1 - 3), Moderate Pain (4 - 6)      Vital Signs Last 24 Hrs  T(C): 36.3 (2019 19:40), Max: 36.9 (2019 13:24)  T(F): 97.4 (2019 19:40), Max: 98.4 (2019 13:24)  HR: 84 (2019 19:40) (78 - 87)  BP: 160/88 (2019 19:40) (133/71 - 160/88)  BP(mean): --  RR: 16 (2019 19:40) (16 - 18)  SpO2: 98% (2019 19:40) (98% - 100%)  CAPILLARY BLOOD GLUCOSE        I&O's Summary    2019 07:01  -  2019 07:00  --------------------------------------------------------  IN: 100 mL / OUT: 0 mL / NET: 100 mL        PHYSICAL EXAM:  GENERAL: NAD  EYES: EOMI, PERRLA  NECK: Supple, No JVD  CHEST/LUNG: dec breath sounds at bases , rhonchi + ant   HEART:  S1 , S2 +  ABDOMEN: soft , bs+  EXTREMITIES:  no edema  NEUROLOGY:alert awake calm       LABS:                        10.9   4.82  )-----------( 281      ( 2019 02:14 )             34.5     07-29    125<L>  |  94<L>  |  7   ----------------------------<  98  4.6   |  21<L>  |  0.55    Ca    9.0      2019 06:00  Phos  3.3       Mg     2.0         TPro  7.4  /  Alb  4.1  /  TBili  0.3  /  DBili  x   /  AST  27  /  ALT  13  /  AlkPhos  107            Urinalysis Basic - ( 2019 04:20 )    Color: COLORLESS / Appearance: CLEAR / S.008 / pH: 7.5  Gluc: NEGATIVE / Ketone: NEGATIVE  / Bili: NEGATIVE / Urobili: NORMAL   Blood: NEGATIVE / Protein: 10 / Nitrite: NEGATIVE   Leuk Esterase: NEGATIVE / RBC: x / WBC x   Sq Epi: x / Non Sq Epi: x / Bacteria: x        RADIOLOGY & ADDITIONAL TESTS:    Imaging Personally Reviewed:    Consultant(s) Notes Reviewed:      Care Discussed with Consultants/Other Providers:

## 2019-07-29 NOTE — CONSULT NOTE ADULT - ASSESSMENT
EKG SR PVC    Echo: < from: Transthoracic Echocardiogram (07.29.19 @ 10:04) >  1. Mitral annular calcification, otherwise normal mitral  valve. Minimal mitral regurgitation.  2. Normal left ventricular internal dimensions and wall  thicknesses.  3. Endocardium not well visualized; grossly normal left  ventricular systolic function.  4. Normal right ventricular size and function.  5. Estimated right ventricular systolic pressure equals 37  mm Hg, assuming right atrial pressure equals 10 mm Hg,  consistent withborderline pulmonary hypertension.    < end of copied text >     Assessment and Plan     1) Dizziness and Fall: chronic. echo and ekg normal , can be 2/2 hyponatremia , monitor on tele , one episode of PAT (brief)     2) Hyponatremia : t/t per primary team, euvolemic to hypovolemic on exam      3) DVT PPX heparin

## 2019-07-29 NOTE — PROGRESS NOTE ADULT - SUBJECTIVE AND OBJECTIVE BOX
Patient with extensive left lower eyelid laceration s/p repair (Galileo/Preston) Two lacerations ( one along lower  lid margin -skin junction approx. 2 cm closed with running 5-0 fast. Vertical medial canthal laceration appox 1 cm closed with running 5-0 fast.) Wound site non-erythematous, clean, clinically noninfected and well appearing  - c/w  Erythromycin ointment TID  - c/w PO abx  - o/p f/u with oculoplastics Patient with extensive left lower eyelid laceration s/p repair (Galileo/Preston) Two lacerations ( one along lower  lid margin -skin junction approx. 2 cm closed with running 5-0 fast. Vertical medial canthal laceration appox 1 cm closed with running 5-0 fast.) Wound site non-erythematous, clean, clinically noninfected and well appearing.  Pt also noted to have medial wall fracture.  - c/w  Erythromycin ointment TID OS to laceration and on the lids  - c/w PO abx  - no nose blowing  - ice packs for first 48 hours  - f/u with facial plastics for fracture  - o/p f/u with oculoplastics within 1 week of discharge, sooner if symptoms worsen or change

## 2019-07-29 NOTE — CONSULT NOTE ADULT - ASSESSMENT
89 yo F with no significant PMH (has no PCP) p/w dizziness and s/p unwitnessed fall last night. nephrologist consulted for hyponatremia, currently on Na tab    Hyponatremia  urine osmo low, urine Na high, urine specific gravity also low, possible polydipsia  review from [facility]rise showed hx of hyponatremia in oct 2018  seems euvolemic on exam  currently on Na tab  TSH normal  check  cortisol level  monitor Na  Na should not correct >8meq in 24 hrs    HTN  controlled  monitor     syncope  pending work up 89 yo F with no significant PMH (has no PCP) p/w dizziness and s/p unwitnessed fall last night. nephrologist consulted for hyponatremia, currently on Na tab    Hyponatremia  urine osmo low, urine Na high, urine specific gravity also low, possible polydipsia  review from [facility] showed hx of hyponatremia in oct 2018  seems euvolemic on exam  currently on Na tab  TSH normal  check  cortisol level  monitor Na  Na should not correct >8meq in 24 hrs    HTN  controlled  monitor     syncope  f/u cardio  orthostatic +, consider NS bolus 250cc 89 yo F with no significant PMH (has no PCP) p/w dizziness and s/p unwitnessed fall last night. nephrologist consulted for hyponatremia, currently on Na tab    Hyponatremia  urine osmo low, urine Na high, urine specific gravity also low, possible polydipsia  review from [facility] showed hx of hyponatremia in oct 2018  seems euvolemic on exam  currently on Na tab  TSH normal  check  cortisol level  monitor Na  Na should not correct >8meq in 24 hrs  Free water restriction 1L/Day    HTN  controlled  monitor     syncope  f/u cardio  orthostatic +, consider NS bolus 250cc

## 2019-07-30 ENCOUNTER — TRANSCRIPTION ENCOUNTER (OUTPATIENT)
Age: 84
End: 2019-07-30

## 2019-07-30 VITALS
RESPIRATION RATE: 16 BRPM | DIASTOLIC BLOOD PRESSURE: 66 MMHG | HEART RATE: 96 BPM | SYSTOLIC BLOOD PRESSURE: 129 MMHG | TEMPERATURE: 99 F | OXYGEN SATURATION: 99 %

## 2019-07-30 LAB
ANION GAP SERPL CALC-SCNC: 11 MMO/L — SIGNIFICANT CHANGE UP (ref 7–14)
BUN SERPL-MCNC: 5 MG/DL — LOW (ref 7–23)
CALCIUM SERPL-MCNC: 9.3 MG/DL — SIGNIFICANT CHANGE UP (ref 8.4–10.5)
CHLORIDE SERPL-SCNC: 96 MMOL/L — LOW (ref 98–107)
CO2 SERPL-SCNC: 23 MMOL/L — SIGNIFICANT CHANGE UP (ref 22–31)
CORTIS SERPL-MCNC: 13.4 UG/DL — SIGNIFICANT CHANGE UP (ref 2.7–18.4)
CREAT SERPL-MCNC: 0.5 MG/DL — SIGNIFICANT CHANGE UP (ref 0.5–1.3)
GLUCOSE SERPL-MCNC: 111 MG/DL — HIGH (ref 70–99)
HCT VFR BLD CALC: 34.5 % — SIGNIFICANT CHANGE UP (ref 34.5–45)
HGB BLD-MCNC: 10.7 G/DL — LOW (ref 11.5–15.5)
MAGNESIUM SERPL-MCNC: 1.9 MG/DL — SIGNIFICANT CHANGE UP (ref 1.6–2.6)
MCHC RBC-ENTMCNC: 23 PG — LOW (ref 27–34)
MCHC RBC-ENTMCNC: 31 % — LOW (ref 32–36)
MCV RBC AUTO: 74 FL — LOW (ref 80–100)
NRBC # FLD: 0 K/UL — SIGNIFICANT CHANGE UP (ref 0–0)
PLATELET # BLD AUTO: 279 K/UL — SIGNIFICANT CHANGE UP (ref 150–400)
PMV BLD: 9.3 FL — SIGNIFICANT CHANGE UP (ref 7–13)
POTASSIUM SERPL-MCNC: 4.3 MMOL/L — SIGNIFICANT CHANGE UP (ref 3.5–5.3)
POTASSIUM SERPL-SCNC: 4.3 MMOL/L — SIGNIFICANT CHANGE UP (ref 3.5–5.3)
RBC # BLD: 4.66 M/UL — SIGNIFICANT CHANGE UP (ref 3.8–5.2)
RBC # FLD: 16.2 % — HIGH (ref 10.3–14.5)
SODIUM SERPL-SCNC: 130 MMOL/L — LOW (ref 135–145)
WBC # BLD: 6.21 K/UL — SIGNIFICANT CHANGE UP (ref 3.8–10.5)
WBC # FLD AUTO: 6.21 K/UL — SIGNIFICANT CHANGE UP (ref 3.8–10.5)

## 2019-07-30 RX ORDER — AMLODIPINE BESYLATE 2.5 MG/1
1 TABLET ORAL
Qty: 30 | Refills: 0
Start: 2019-07-30 | End: 2019-08-28

## 2019-07-30 RX ORDER — ERYTHROMYCIN BASE 5 MG/GRAM
1 OINTMENT (GRAM) OPHTHALMIC (EYE)
Qty: 1 | Refills: 0
Start: 2019-07-30 | End: 2019-08-06

## 2019-07-30 RX ORDER — SODIUM CHLORIDE 9 MG/ML
1 INJECTION INTRAMUSCULAR; INTRAVENOUS; SUBCUTANEOUS
Qty: 90 | Refills: 0
Start: 2019-07-30 | End: 2019-08-28

## 2019-07-30 RX ADMIN — SODIUM CHLORIDE 1 GRAM(S): 9 INJECTION INTRAMUSCULAR; INTRAVENOUS; SUBCUTANEOUS at 11:29

## 2019-07-30 RX ADMIN — Medication 1 APPLICATION(S): at 05:12

## 2019-07-30 RX ADMIN — Medication 1 APPLICATION(S): at 11:29

## 2019-07-30 RX ADMIN — Medication 1 TABLET(S): at 17:17

## 2019-07-30 NOTE — DISCHARGE NOTE PROVIDER - CARE PROVIDERS DIRECT ADDRESSES
[email or web address],[email or web address],DirectAddress_Unknown

## 2019-07-30 NOTE — DISCHARGE NOTE PROVIDER - HOSPITAL COURSE
89 y/o F with no significant PMHx presented with dizziness and s/p unwitnessed fall, admitted to [facility] for syncope, r/o ACS, and hyponatremia.          1. Unwitnessed fall / Syncope    - sustained left lower lid laceration, s/p sutures by ophtho 7/28    - erythromycin ointment x 10 days, po augmentin x 7 days    - Orthostatic positive, s/p IVF - repeat orthostatics negative    - Troponin negative x2    - UA: neg    - Head CT: Mildly motion degraded exam. Left frontal scalp hematoma. No gross acute intracranial hemorrhage, mass effect, or displaced calvarial fracture. Stable chronic findings.    - Cervical spine CT: No evidence for acute displaced fracture or traumatic malalignment. Cervical degenerative spondylosis.    - CT Orbit w/o contrast: Left forehead/periorbital soft tissue hematoma. Non-displaced acute fracture of the anterior left lamina papyracea. No radiopaque foreign body. Intraorbital contents appear intact.    - Outpatient Ophthalmology and Oculoplastics follow up    - Echocardiogram: EF 61%, normal LV systolic function, borderline pulmonary hypertension.        2. Hyponatremia    - likely secondary to SIADH    - liberalized salt in diet    - started on salt tabs         3. Pulmonary nodules     - CT chest: Branching tubular density within the superior segment of right lower lobe most likely mucoid impaction. Will needs f/u CT in 3-6mos. Stable 6 mm right middle lobe nodule. No lung mass or consolidation.         4. HTN    - Amlodipine started        PT recommended home PT, but patient and son refused home services.        Speech and Swallow recommended soft diet with thin liquids        Case discussed with Dr. Little and patient is medically stable for discharge home. Reviewed discharge medications with patient's son; All new medications requiring new prescription sent to pharmacy of patients choice.

## 2019-07-30 NOTE — DISCHARGE NOTE PROVIDER - NSFOLLOWUPCLINICS_GEN_ALL_ED_FT
[facility]  Ophthalmology  [address] [place] [address] [address]  [place] [address]  Phone: [phone]  Fax:   Follow Up Time:

## 2019-07-30 NOTE — SWALLOW BEDSIDE ASSESSMENT ADULT - POSITIONING
Problem: Pain  Goal: #Acceptable pain/comfort level is achieved/maintained at rest (based on self report using Numeric Rating Scales/Faces  Outcome: Outcome Met, Continue evaluating goal progress toward completion  Patient is resting in chair comfortably no complains for pain after PRN pain med given, will continue to monitor.        upright (90 degrees)

## 2019-07-30 NOTE — SWALLOW BEDSIDE ASSESSMENT ADULT - SWALLOW EVAL: DIAGNOSIS
Patient demonstrated functional oral and pharyngeal stages of swallowing characterized by adequate oral acceptance/containment, increased mastication time yet able to break down soft solid texture (associated with decreased dentition), functional bolus collection, timely anterior-posterior transfer, adequate initiation of the pharyngeal swallow, palpable laryngeal elevation/excursion and no overt, clinical s/s of laryngeal penetration/aspiration across trials of puree, soft solids and thin liquids.

## 2019-07-30 NOTE — DISCHARGE NOTE NURSING/CASE MANAGEMENT/SOCIAL WORK - NSDCDPATPORTLINK_GEN_ALL_CORE
You can access the [employer] Patient Portal, offered by [facility], by registering with the following website: [email or web address]

## 2019-07-30 NOTE — DISCHARGE NOTE PROVIDER - PROVIDER TOKENS
PROVIDER:[TOKEN:[[id number]]],PROVIDER:[TOKEN:[[id number]]] PROVIDER:[TOKEN:[[id number]]],PROVIDER:[TOKEN:[[id number]]],PROVIDER:[TOKEN:[[id number]]]

## 2019-07-30 NOTE — DISCHARGE NOTE PROVIDER - NSDCCPCAREPLAN_GEN_ALL_CORE_FT
PRINCIPAL DISCHARGE DIAGNOSIS  Diagnosis: Syncope and collapse  Assessment and Plan of Treatment: Rise from sitting to standing position slowly. You are deferring home Physical Therapy at this time. Follow up with your primary care physician within 1-2 weeks; call for appointment.      SECONDARY DISCHARGE DIAGNOSES  Diagnosis: Eye laceration  Assessment and Plan of Treatment: No suture removal needed. Continue Amoxicillin/Clavulanate twice a day through 8/3/19. Continue Erythromycin ointment three times daily through 8/6/19. Follow up with your ophthalmologist or in the [facility] within 1 week of discharge; office located at [address] [place] [address]; call [phone] for appointment.      Diagnosis: Fracture of orbit, closed, initial encounter  Assessment and Plan of Treatment: No nose blowing. Outpatient follow up with oculoplastics, Dr. Johnston, within 1 week of discharge, sooner if symptoms worsen or change. Office located at [address] [facility], [address], [place] [address]; call [phone] for appointment.    Diagnosis: Hyponatremia  Assessment and Plan of Treatment: Continue sodium chloride tabs. Water restriction 1L per day Follow up with your primary care physician or Dr. Gonzales within 1-2 weeks to monitor your sodium levels. Dr. Gonzales's office located at [address] [facility], [address]nd floor, [place] [address]; call [phone] for appointment.    Diagnosis: HTN (hypertension)  Assessment and Plan of Treatment: Amlodipine started for blood pressure control. Follow up with your primary care physician within 1-2 weeks to monitor your blood pressure.    Diagnosis: Pulmonary nodule  Assessment and Plan of Treatment: Repeat CT Chest in 3-6 months to determine stability. PRINCIPAL DISCHARGE DIAGNOSIS  Diagnosis: Syncope and collapse  Assessment and Plan of Treatment: Rise from sitting to standing position slowly. You are deferring home Physical Therapy at this time. Follow up with your primary care physician within 1-2 weeks; call for appointment.      SECONDARY DISCHARGE DIAGNOSES  Diagnosis: Eye laceration  Assessment and Plan of Treatment: No suture removal needed. Continue Amoxicillin/Clavulanate twice a day through 8/3/19. Continue Erythromycin ointment three times daily through 8/6/19. Follow up with your ophthalmologist or in the [facility] within 1 week of discharge; office located at [address] [place] [address]; call [phone] for appointment.      Diagnosis: Fracture of orbit, closed, initial encounter  Assessment and Plan of Treatment: No nose blowing. Outpatient follow up with oculoplastics, Dr. Johnston, within 1 week of discharge, sooner if symptoms worsen or change. Office located at [address] [facility] [address], [place] [address]; call [phone] for appointment.    Diagnosis: Hyponatremia  Assessment and Plan of Treatment: Continue sodium chloride tabs. Water restriction 1L per day Follow up with your primary care physician or Dr. Gonzales within 1-2 weeks to monitor your sodium levels. Dr. Gonzales's office located at [address] [place] [address], [place] [address]; call [phone] for appointment.    Diagnosis: HTN (hypertension)  Assessment and Plan of Treatment: Amlodipine started for blood pressure control. Follow up with your primary care physician within 1-2 weeks to monitor your blood pressure. You may also follow up with Dr. Vazquez (Cardiologist) located at [address] [place] [address], call [phone] for appointment.    Diagnosis: Pulmonary nodule  Assessment and Plan of Treatment: Repeat CT Chest in 3-6 months to determine stability.

## 2019-07-30 NOTE — SWALLOW BEDSIDE ASSESSMENT ADULT - COMMENTS
89 yo F with no significant PMH (has no PCP) p/w dizziness and s/p unwitnessed fall, admitted to tele for syncope, r/o ACS, and hyponatremia.      Patient was received awake, alert and cooperative. Patient's grandson/caregiver (Jesus Blum) present at bedside and served as  after declining use of phone  system given patient is primarily [place]-speaking. Recommendations discussed with Tele PA.

## 2019-07-30 NOTE — PROGRESS NOTE ADULT - SUBJECTIVE AND OBJECTIVE BOX
Gallo Vazquez MD  Interventional Cardiology  [place] Office : [address] [place] [address]  Tel:   [place] Office : [address] [facility] [place]. [address]  Tel: [phone]  Cell : [phone]    Subjective : Pt lying in bed comfortable, not in distress, denies any chest pain or SOB  	  MEDICATIONS:  amLODIPine   Tablet 2.5 milliGRAM(s) Oral daily    amoxicillin  875 milliGRAM(s)/clavulanate 1 Tablet(s) Oral every 12 hours      acetaminophen   Tablet .. 650 milliGRAM(s) Oral every 6 hours PRN        erythromycin   Ointment 1 Application(s) Left EYE three times a day  sodium chloride 1 Gram(s) Oral three times a day      PHYSICAL EXAM:  T(C): 37.1 (07-30-19 @ 12:08), Max: 37.1 (07-30-19 @ 12:08)  HR: 96 (07-30-19 @ 12:08) (88 - 96)  BP: 129/66 (07-30-19 @ 12:08) (129/66 - 132/80)  RR: 16 (07-30-19 @ 12:08) (16 - 16)  SpO2: 99% (07-30-19 @ 12:08) (99% - 100%)  Wt(kg): --  I&O's Summary        Appearance: Normal	  HEENT:   Left eye injury s/p sutures 	  Cardiovascular: Normal S1 S2, No JVD, No murmurs  Respiratory: Lungs clear to auscultation	  Gastrointestinal:  Soft, Non-tender, + BS	  Extremities: No clubbing, cyanosis or edema                                  10.7   6.21  )-----------( 279      ( 30 Jul 2019 07:30 )             34.5     07-30    130<L>  |  96<L>  |  5<L>  ----------------------------<  111<H>  4.3   |  23  |  0.50    Ca    9.3      30 Jul 2019 07:30  Mg     1.9     07-30      proBNP:   Lipid Profile:   HgA1c:   TSH:

## 2019-07-30 NOTE — PROGRESS NOTE ADULT - ASSESSMENT
EKG SR PVC    Echo: < from: Transthoracic Echocardiogram (07.29.19 @ 10:04) >  1. Mitral annular calcification, otherwise normal mitral  valve. Minimal mitral regurgitation.  2. Normal left ventricular internal dimensions and wall  thicknesses.  3. Endocardium not well visualized; grossly normal left  ventricular systolic function.  4. Normal right ventricular size and function.  5. Estimated right ventricular systolic pressure equals 37  mm Hg, assuming right atrial pressure equals 10 mm Hg,  consistent withborderline pulmonary hypertension.    < end of copied text >     Assessment and Plan     1) Dizziness and Fall: chronic. echo and ekg normal , can be 2/2 hyponatremia, one episode of PAT (brief)     2) Hyponatremia : t/t per primary team    3) DVT PPX heparin

## 2019-07-30 NOTE — SWALLOW BEDSIDE ASSESSMENT ADULT - SWALLOW EVAL: RECOMMENDED FEEDING/EATING TECHNIQUES
position upright (90 degrees)/maintain upright posture during/after eating for 30 mins/no straws/Slow rate of intake/small sips/bites

## 2019-07-30 NOTE — DISCHARGE NOTE PROVIDER - CARE PROVIDER_API CALL
Krystal Johnston)  Ophthalmology  [address] [facility], [address]  [place] [address]  Phone: [phone]  Fax: [phone]  Follow Up Time:     Nestor Gonzales)  Internal Medicine; Nephrology  [address], [address]nd [address]  [place] [id number]  Phone: [phone]  Fax: [phone]  Follow Up Time: Krystal Johnston)  Ophthalmology  [address] [facility], [address]  [place] [address]  Phone: [phone]  Fax: [phone]  Follow Up Time:     Nestor Gonzales)  Internal Medicine; Nephrology  [address], 2nd [place] [address]  Phone: [phone]  Fax: [phone]  Follow Up Time:     Gallo Vazquez)  Cardiovascular Disease; Nuclear Cardiology  [address] [place] [address]  Phone: [phone]  Fax: [phone]  Follow Up Time:

## 2019-07-30 NOTE — PROGRESS NOTE ADULT - ASSESSMENT
89 yo F with no significant PMH (has no PCP) p/w dizziness and s/p unwitnessed fall last night. nephrologist consulted for hyponatremia, currently on Na tab    Hyponatremia  urine osmo low, urine Na high, urine specific gravity also low, possible polydipsia  review from [facility] showed hx of hyponatremia in oct 2018  seems euvolemic on exam  currently on Na tab  TSH cortisol normal  na improving   monitor Na  Na should not correct >8meq in 24 hrs  Free water restriction 1L/Day    HTN  controlled  monitor     syncope  f/u cardio  orthostatic + 7/28, consider repeat NS bolus 250cc x1 if still +

## 2019-07-31 DIAGNOSIS — Z71.89 OTHER SPECIFIED COUNSELING: ICD-10-CM

## 2019-08-07 PROBLEM — H53.8 OTHER VISUAL DISTURBANCES: Chronic | Status: ACTIVE | Noted: 2019-07-28

## 2019-08-20 ENCOUNTER — APPOINTMENT (OUTPATIENT)
Dept: OPHTHALMOLOGY | Facility: CLINIC | Age: 84
End: 2019-08-20

## 2019-08-20 PROBLEM — Z00.00 ENCOUNTER FOR PREVENTIVE HEALTH EXAMINATION: Status: ACTIVE | Noted: 2019-08-20

## 2020-10-13 NOTE — PHYSICAL THERAPY INITIAL EVALUATION ADULT - LEVEL OF INDEPENDENCE: GAIT, REHAB EVAL
Last Visit: 8/17/20 with NP Gosia Stahl  Next Appointment: Advised to follow-up in 4 months  Previous Refill Encounter(s): 2/17/20 #90 with 1 refill    Requested Prescriptions     Pending Prescriptions Disp Refills    losartan-hydroCHLOROthiazide (HYZAAR) 100-12.5 mg per tablet 90 Tab 1     Sig: TAKE 1 TABLET BY MOUTH EVERY DAY
contact guard/supervision

## 2021-01-04 NOTE — PATIENT PROFILE ADULT - NSPROHMSYMPCOND_GEN_A_NUR
"38w3d    Chief Complaint   Patient presents with     Prenatal Care     having a lot of tightening of stomach--discuss when she has to come to the hospital       Initial /60   Ht 1.753 m (5' 9\")   Wt 95.7 kg (211 lb)   LMP 04/10/2020 (Exact Date)   BMI 31.16 kg/m   Estimated body mass index is 31.16 kg/m  as calculated from the following:    Height as of this encounter: 1.753 m (5' 9\").    Weight as of this encounter: 95.7 kg (211 lb).  BP completed using cuff size: regular    Questioned patient about current smoking habits.  Pt. quit smoking some time ago.          The following HM Due: NONE      " none

## 2021-04-19 NOTE — ED ADULT NURSE NOTE - NSFALLRSKASSESSDT_ED_ALL_ED
AUDIOLOGY REPORT    SUBJECTIVE:  Betsey Vanessa is a 58 year old female who was seen in the Audiology Clinic at the [facility] for audiologic evaluation, referred by Laura Stallings M.D. .The patient has been seen previously at [facility] on 1/17/2020 for assessment and results indicated a profound hearing loss in the left ear and a right mixed hearing loss. The patient reports bilateral aural fullness and bilateral tinnitus.  She was implanted with a right BAHA in [place], [place] but has had extensive issues regarding feedback of the device. Her history is significant for a Meniere's diagnosis. She underwent a left labyrinthectomy. She reports that the hearing in her right ear does tend to fluctuate. The patient denies  bilateral otalgia.  The patient notes difficulty with communication in a variety of listening situations.  They were accompanied today by their daughter.    OBJECTIVE:  Otoscopic exam indicates a right eardrum perforation and left PE tube in place    Pure Tone Thresholds assessed using conventional audiometry with fair to good  reliability from 250-8000 Hz bilaterally using circumaural headphones     RIGHT:  severe sloping to profound mixed hearing loss    LEFT:    profound hearing loss   Could not mask for bone conduction for either ear due to equipment limits.    Tympanogram:    RIGHT: large ear canal volume consistent with tympanic membrane perforation    LEFT:   large ear canal volume consistent with tympanic membrane perforation    Reflexes (reported by stimulus ear):  Did not test due to right eardrum perforation and left PE tube    Speech Reception Threshold:    RIGHT: 80 dB HL    Speech Detection Threshold:    LEFT:   100 dB HL (unexpected but repeatable)    Word Recognition Score:     RIGHT: 88% at 100 dB HL using NU-6 recorded word list.    LEFT:   0% at 100 dB HL using NU-6 recorded word list.      ASSESSMENT:     ICD-10-CM    1. Meniere's disease of  left ear  H81.02 AUDIOLOGY ADULT REFERRAL   2. Sensorineural hearing loss (SNHL) of both ears  H90.3 AUDIOLOGY ADULT REFERRAL       Compared to patient's previous audiogram dated 1/17/2020, hearing has remained stable. Today s results were discussed with the patient in detail.     PLAN:  Patient was counseled regarding hearing loss and impact on communication.  Patient continues to be a good candidate for right amplification at this time. It is recommended that the patient repeat the hearing evaluation in 1-2 years, sooner if concerns arise.  Patient will continue with BAHA check and hearing aid check.  Please call this clinic with questions regarding these results or recommendations.        Alee Lutz  Audiologist  [place] License  #[id number]         28-Jul-2019 02:23

## 2021-05-03 NOTE — ED ADULT TRIAGE NOTE - CHIEF COMPLAINT QUOTE
Instructions: This plan will send the code FBSE to the PM system.  DO NOT or CHANGE the price.
Price (Do Not Change): 0.00
Detail Level: Simple
Pt c/o fall outside bathroom. Pt states she felt dizzy before falling. Pt noted to have large hematoma over left eye and jyothi abrasion under left eye. Left eye currently bleeding in triage. Pt denies visual changes in left eye. Pt denies dizziness, cp, loc, AC use.

## 2022-02-25 NOTE — H&P ADULT - PROBLEM SELECTOR PLAN 2
<-- Click to add NO significant Past Surgical History Gentle IV fluids hydration w/ IV NS @ 75cc/hr  Repeat BMP pending  Urine +Serum osmolality ordered

## 2022-05-29 NOTE — PATIENT PROFILE ADULT - NSPROGENDIFFINTUB_GEN_A_NUR
Trauma HPI





- General


Stated Complaint: Arm Lac/Trauma


Time Seen by Provider: 05/29/22 18:22


Source: patient


Mode of arrival: ambulatory


Limitations: no limitations





- History of Present Illness


Initial Comments: 


Alexandre is a right-hand-dominant 64-year-old male who presents to the emergency 

department today with a complaint of injury to the right thumb.  Patient reports

that he was using a circular saw, the artie on the salt was not down and he 

inadvertently brought the saw down the back of his right hand cutting through 

his glove and his thumb.  Patient is a nonsmoker, he is well-controlled diabetic

on metformin.





- Related Data


                                    Allergies











Allergy/AdvReac Type Severity Reaction Status Date / Time


 


No Known Allergies Allergy   Verified 05/29/22 18:26














Review of Systems


ROS Statement: 


Those systems with pertinent positive or pertinent negative responses have been 

documented in the HPI.





ROS Other: All systems not noted in ROS Statement are negative.





Past Medical History


Past Medical History: Hypertension


History of Any Multi-Drug Resistant Organisms: None Reported


Past Surgical History: Back Surgery, Cholecystectomy, Orthopedic Surgery


Past Psychological History: No Psychological Hx Reported


Past Alcohol Use History: Rare


Past Drug Use History: None Reported





General Exam





- General Exam Comments


Initial Comments: 


Physical Exam


GENERAL:


Pale, diaphoretic





HENT:


Normocephalic, Atraumatic. 





EYES:


PERRL, EOMI





PULMONARY:


Tachypnea, unlabored





CARDIOVASCULAR:


There is a regular rate and rhythm without any murmurs gallops or rubs.  


Tourniquet on right forearm upon arrival, there are week but palpable radial and

ulnar pulses on right 





ABDOMEN:


Soft and nontender with normal bowel sounds. 





SKIN:


Large macerated laceration at the base of the right thumb, from the dorsal 

surface through the thumb to the thenar eminence since, palmar surface of the 

thumb is intact.  Bone fragment and ligaments are visible in the wound





: 


Deferred





NEUROLOGIC:


Patient is alert and oriented x3. 





MUSCULOSKELETAL:


Patient is still able to oppose the thumb but cannot extend, there is obvious 

laceration of the extensor tendons





PSYCHIATRIC:


Normal psychiatric evaluation.





Limitations: no limitations





Course


                                   Vital Signs











  05/29/22 05/29/22 05/29/22





  18:24 18:40 19:09


 


Pulse Rate 61 78 72


 


Respiratory 20 18 16





Rate   


 


Blood Pressure 108/56 117/53 107/84


 


O2 Sat by Pulse 98 97 98





Oximetry   














Medical Decision Making





- Medical Decision Making


The patient was seen and evaluated


Labs, pain medications and x-rays were ordered


The patient and wife are certainly the patient is up-to-date on tetanus vaccine





Patient has large soft tissue defect with obvious fracture and injury to the 

tendons.  However patient does not have any major vascular injuries or 

significant bleeding. 





Patient care was discussed with orthopedics on call Dr. Elena who 

recommended patient be transferred to a facility with hand surgery available





X-rays confirm an open fracture.  





Patient requests transfer to [place] as he lives in the area, patient care was d

iscussed with  trauma surgeon at [facility] who accepts the 

transfer.





Patient received 1 more milligram of Dilaudid while he attempted to wash out the

hand however pain was managed and he received 30 mg IV ketamine, patient was 

much more relaxed tolerated washout.  Patient remained resting in bed with 

guardrails up after washout.





Patient received 2 g Ancef in the emergency department 





Patient repeat dose of ketamine prior to transfer








- Lab Data


Result diagrams: 


                                 05/29/22 18:31





                                 05/29/22 18:31


                                   Lab Results











  05/29/22 05/29/22 05/29/22 Range/Units





  18:31 18:31 18:31 


 


WBC  10.0    (3.8-10.6)  k/uL


 


RBC  4.76    (4.30-5.90)  m/uL


 


Hgb  13.9    (13.0-17.5)  gm/dL


 


Hct  42.4    (39.0-53.0)  %


 


MCV  89.1    (80.0-100.0)  fL


 


MCH  29.3    (25.0-35.0)  pg


 


MCHC  32.9    (31.0-37.0)  g/dL


 


RDW  13.9    (11.5-15.5)  %


 


Plt Count  217    (150-450)  k/uL


 


MPV  7.5    


 


Neutrophils %  63    %


 


Lymphocytes %  29    %


 


Monocytes %  4    %


 


Eosinophils %  2    %


 


Basophils %  1    %


 


Neutrophils #  6.3    (1.3-7.7)  k/uL


 


Lymphocytes #  2.9    (1.0-4.8)  k/uL


 


Monocytes #  0.4    (0-1.0)  k/uL


 


Eosinophils #  0.2    (0-0.7)  k/uL


 


Basophils #  0.1    (0-0.2)  k/uL


 


PT     (9.0-12.0)  sec


 


INR     (<1.2)  


 


APTT     (22.0-30.0)  sec


 


Sodium   138   (137-145)  mmol/L


 


Potassium   4.0   (3.5-5.1)  mmol/L


 


Chloride   101   ()  mmol/L


 


Carbon Dioxide   22   (22-30)  mmol/L


 


Anion Gap   15   mmol/L


 


BUN   15   (9-20)  mg/dL


 


Creatinine   1.15   (0.66-1.25)  mg/dL


 


Est GFR (CKD-EPI)AfAm   78   (>60 ml/min/1.73 sqM)  


 


Est GFR (CKD-EPI)NonAf   67   (>60 ml/min/1.73 sqM)  


 


Glucose   170 H   (74-99)  mg/dL


 


Calcium   11.1 H   (8.4-10.2)  mg/dL


 


Total Bilirubin   2.8 H   (0.2-1.3)  mg/dL


 


AST   33   (17-59)  U/L


 


ALT   34   (4-49)  U/L


 


Alkaline Phosphatase   73   ()  U/L


 


Total Protein   7.6   (6.3-8.2)  g/dL


 


Albumin   5.0   (3.5-5.0)  g/dL


 


Blood Type    O Positive  


 


Blood Type Confirm     


 


Blood Type Recheck    No Previous Record  


 


Bld Type Recheck Status    CABO Indicated  


 


Antibody Screen    NEGATIVE  


 


Spec Expiration Date    06/01/2022 - 2331 05/29/22 05/29/22 Range/Units





  18:35 19:43 


 


WBC    (3.8-10.6)  k/uL


 


RBC    (4.30-5.90)  m/uL


 


Hgb    (13.0-17.5)  gm/dL


 


Hct    (39.0-53.0)  %


 


MCV    (80.0-100.0)  fL


 


MCH    (25.0-35.0)  pg


 


MCHC    (31.0-37.0)  g/dL


 


RDW    (11.5-15.5)  %


 


Plt Count    (150-450)  k/uL


 


MPV    


 


Neutrophils %    %


 


Lymphocytes %    %


 


Monocytes %    %


 


Eosinophils %    %


 


Basophils %    %


 


Neutrophils #    (1.3-7.7)  k/uL


 


Lymphocytes #    (1.0-4.8)  k/uL


 


Monocytes #    (0-1.0)  k/uL


 


Eosinophils #    (0-0.7)  k/uL


 


Basophils #    (0-0.2)  k/uL


 


PT   12.7 H  (9.0-12.0)  sec


 


INR   1.2 H  (<1.2)  


 


APTT   22.1  (22.0-30.0)  sec


 


Sodium    (137-145)  mmol/L


 


Potassium    (3.5-5.1)  mmol/L


 


Chloride    ()  mmol/L


 


Carbon Dioxide    (22-30)  mmol/L


 


Anion Gap    mmol/L


 


BUN    (9-20)  mg/dL


 


Creatinine    (0.66-1.25)  mg/dL


 


Est GFR (CKD-EPI)AfAm    (>60 ml/min/1.73 sqM)  


 


Est GFR (CKD-EPI)NonAf    (>60 ml/min/1.73 sqM)  


 


Glucose    (74-99)  mg/dL


 


Calcium    (8.4-10.2)  mg/dL


 


Total Bilirubin    (0.2-1.3)  mg/dL


 


AST    (17-59)  U/L


 


ALT    (4-49)  U/L


 


Alkaline Phosphatase    ()  U/L


 


Total Protein    (6.3-8.2)  g/dL


 


Albumin    (3.5-5.0)  g/dL


 


Blood Type    


 


Blood Type Confirm  O Positive   


 


Blood Type Recheck    


 


Bld Type Recheck Status    


 


Antibody Screen    


 


Spec Expiration Date    














Critical Care Time


Critical Care Time: Yes


Total Critical Care Time: 30





Disposition


Clinical Impression: 


 Open fracture of right thumb, Laceration of extensor muscle and tendon of thumb

at wrist and hand level





Disposition: OTHER INSTITUTION NOT DEFINED


Condition: Serious


Is patient prescribed a controlled substance at d/c from ED?: No


Referrals: 


Nonstaff,Physician [Primary Care Provider] - 1-2 days





- Out of Hospital Transfer - Req. Specs


Out of Hospital Transfer - Requested Specifics: Other Emergency Center ([facility]) never intubated

## 2023-04-21 NOTE — ED PROVIDER NOTE - SHIFT CHANGE
Patient was scheduled with Luis M Hastings MD for COLONOSCOPY  on 5-2-23 at [facility]. WILL instructions Mailed to verified address.[id number]  [place] [address]    Patient advised to contact insurance company to verify coverage verbally/in instructions.  No referral is in chart at this time- please contact PCP to refax to [phone]   Yes...

## 2023-06-09 ENCOUNTER — EMERGENCY (EMERGENCY)
Facility: HOSPITAL | Age: 88
LOS: 1 days | Discharge: HOME | End: 2023-06-09
Attending: EMERGENCY MEDICINE | Admitting: EMERGENCY MEDICINE
Payer: MEDICAID

## 2023-06-09 VITALS
RESPIRATION RATE: 16 BRPM | HEART RATE: 95 BPM | OXYGEN SATURATION: 100 % | TEMPERATURE: 98 F | SYSTOLIC BLOOD PRESSURE: 180 MMHG | DIASTOLIC BLOOD PRESSURE: 79 MMHG

## 2023-06-09 VITALS
TEMPERATURE: 98 F | SYSTOLIC BLOOD PRESSURE: 191 MMHG | DIASTOLIC BLOOD PRESSURE: 89 MMHG | RESPIRATION RATE: 16 BRPM | HEART RATE: 963 BPM | OXYGEN SATURATION: 100 %

## 2023-06-09 DIAGNOSIS — K08.89 OTHER SPECIFIED DISORDERS OF TEETH AND SUPPORTING STRUCTURES: Chronic | ICD-10-CM

## 2023-06-09 DIAGNOSIS — Z98.890 OTHER SPECIFIED POSTPROCEDURAL STATES: Chronic | ICD-10-CM

## 2023-06-09 LAB
ALBUMIN SERPL ELPH-MCNC: 4 G/DL — SIGNIFICANT CHANGE UP (ref 3.3–5)
ALP SERPL-CCNC: 121 U/L — HIGH (ref 40–120)
ALT FLD-CCNC: 16 U/L — SIGNIFICANT CHANGE UP (ref 4–33)
ANION GAP SERPL CALC-SCNC: 11 MMOL/L — SIGNIFICANT CHANGE UP (ref 7–14)
APPEARANCE UR: CLEAR — SIGNIFICANT CHANGE UP
APTT BLD: 26.6 SEC — LOW (ref 27–36.3)
AST SERPL-CCNC: 27 U/L — SIGNIFICANT CHANGE UP (ref 4–32)
BASOPHILS # BLD AUTO: 0.01 K/UL — SIGNIFICANT CHANGE UP (ref 0–0.2)
BASOPHILS NFR BLD AUTO: 0.1 % — SIGNIFICANT CHANGE UP (ref 0–2)
BILIRUB SERPL-MCNC: 0.3 MG/DL — SIGNIFICANT CHANGE UP (ref 0.2–1.2)
BILIRUB UR-MCNC: NEGATIVE — SIGNIFICANT CHANGE UP
BLOOD GAS VENOUS COMPREHENSIVE RESULT: SIGNIFICANT CHANGE UP
BUN SERPL-MCNC: 6 MG/DL — LOW (ref 7–23)
CALCIUM SERPL-MCNC: 9.1 MG/DL — SIGNIFICANT CHANGE UP (ref 8.4–10.5)
CHLORIDE SERPL-SCNC: 96 MMOL/L — LOW (ref 98–107)
CO2 SERPL-SCNC: 23 MMOL/L — SIGNIFICANT CHANGE UP (ref 22–31)
COLOR SPEC: COLORLESS — SIGNIFICANT CHANGE UP
CREAT SERPL-MCNC: 0.52 MG/DL — SIGNIFICANT CHANGE UP (ref 0.5–1.3)
DIFF PNL FLD: NEGATIVE — SIGNIFICANT CHANGE UP
EGFR: 86 ML/MIN/1.73M2 — SIGNIFICANT CHANGE UP
EOSINOPHIL # BLD AUTO: 0.15 K/UL — SIGNIFICANT CHANGE UP (ref 0–0.5)
EOSINOPHIL NFR BLD AUTO: 2.1 % — SIGNIFICANT CHANGE UP (ref 0–6)
GLUCOSE SERPL-MCNC: 110 MG/DL — HIGH (ref 70–99)
GLUCOSE UR QL: NEGATIVE — SIGNIFICANT CHANGE UP
HCT VFR BLD CALC: 36.8 % — SIGNIFICANT CHANGE UP (ref 34.5–45)
HGB BLD-MCNC: 11.5 G/DL — SIGNIFICANT CHANGE UP (ref 11.5–15.5)
IANC: 4.02 K/UL — SIGNIFICANT CHANGE UP (ref 1.8–7.4)
IMM GRANULOCYTES NFR BLD AUTO: 0 % — SIGNIFICANT CHANGE UP (ref 0–0.9)
INR BLD: 0.91 RATIO — SIGNIFICANT CHANGE UP (ref 0.88–1.16)
KETONES UR-MCNC: NEGATIVE — SIGNIFICANT CHANGE UP
LEUKOCYTE ESTERASE UR-ACNC: NEGATIVE — SIGNIFICANT CHANGE UP
LYMPHOCYTES # BLD AUTO: 2.1 K/UL — SIGNIFICANT CHANGE UP (ref 1–3.3)
LYMPHOCYTES # BLD AUTO: 29.6 % — SIGNIFICANT CHANGE UP (ref 13–44)
MCHC RBC-ENTMCNC: 24 PG — LOW (ref 27–34)
MCHC RBC-ENTMCNC: 31.3 GM/DL — LOW (ref 32–36)
MCV RBC AUTO: 76.8 FL — LOW (ref 80–100)
MONOCYTES # BLD AUTO: 0.82 K/UL — SIGNIFICANT CHANGE UP (ref 0–0.9)
MONOCYTES NFR BLD AUTO: 11.5 % — SIGNIFICANT CHANGE UP (ref 2–14)
NEUTROPHILS # BLD AUTO: 4.02 K/UL — SIGNIFICANT CHANGE UP (ref 1.8–7.4)
NEUTROPHILS NFR BLD AUTO: 56.7 % — SIGNIFICANT CHANGE UP (ref 43–77)
NITRITE UR-MCNC: NEGATIVE — SIGNIFICANT CHANGE UP
NRBC # BLD: 0 /100 WBCS — SIGNIFICANT CHANGE UP (ref 0–0)
NRBC # FLD: 0 K/UL — SIGNIFICANT CHANGE UP (ref 0–0)
PH UR: 7.5 — SIGNIFICANT CHANGE UP (ref 5–8)
PLATELET # BLD AUTO: 312 K/UL — SIGNIFICANT CHANGE UP (ref 150–400)
POTASSIUM SERPL-MCNC: 5 MMOL/L — SIGNIFICANT CHANGE UP (ref 3.5–5.3)
POTASSIUM SERPL-SCNC: 5 MMOL/L — SIGNIFICANT CHANGE UP (ref 3.5–5.3)
PROT SERPL-MCNC: 7.1 G/DL — SIGNIFICANT CHANGE UP (ref 6–8.3)
PROT UR-MCNC: ABNORMAL
PROTHROM AB SERPL-ACNC: 10.6 SEC — SIGNIFICANT CHANGE UP (ref 10.5–13.4)
RBC # BLD: 4.79 M/UL — SIGNIFICANT CHANGE UP (ref 3.8–5.2)
RBC # FLD: 16.4 % — HIGH (ref 10.3–14.5)
SODIUM SERPL-SCNC: 130 MMOL/L — LOW (ref 135–145)
SP GR SPEC: 1.01 — LOW (ref 1.01–1.05)
TROPONIN T, HIGH SENSITIVITY RESULT: 20 NG/L — SIGNIFICANT CHANGE UP
TSH SERPL-MCNC: 0.85 UIU/ML — SIGNIFICANT CHANGE UP (ref 0.27–4.2)
UROBILINOGEN FLD QL: SIGNIFICANT CHANGE UP
WBC # BLD: 7.1 K/UL — SIGNIFICANT CHANGE UP (ref 3.8–10.5)
WBC # FLD AUTO: 7.1 K/UL — SIGNIFICANT CHANGE UP (ref 3.8–10.5)

## 2023-06-09 PROCEDURE — 70450 CT HEAD/BRAIN W/O DYE: CPT | Mod: 26,MG

## 2023-06-09 PROCEDURE — 93010 ELECTROCARDIOGRAM REPORT: CPT

## 2023-06-09 PROCEDURE — 71045 X-RAY EXAM CHEST 1 VIEW: CPT | Mod: 26

## 2023-06-09 PROCEDURE — 99284 EMERGENCY DEPT VISIT MOD MDM: CPT

## 2023-06-09 PROCEDURE — G1004: CPT

## 2023-06-09 RX ORDER — SODIUM CHLORIDE 9 MG/ML
1000 INJECTION INTRAMUSCULAR; INTRAVENOUS; SUBCUTANEOUS ONCE
Refills: 0 | Status: COMPLETED | OUTPATIENT
Start: 2023-06-09 | End: 2023-06-09

## 2023-06-09 RX ADMIN — SODIUM CHLORIDE 1000 MILLILITER(S): 9 INJECTION INTRAMUSCULAR; INTRAVENOUS; SUBCUTANEOUS at 10:44

## 2023-06-09 NOTE — ED PROVIDER NOTE - PROGRESS NOTE DETAILS
Qasim Macdonald DO, PGY3: Patient's mental status has now improved, patient is now conversant and interactive with grandson and per grandson patient is acting back at baseline no metabolic or infectious etiology to explain symptoms identified on imaging or labs.  While CT head was limited study, as patient returned back to baseline no concern for stroke at this time.  Had shared decision making conversation with grandson talked about possibility of repeating head CT and further imaging shared decision making to discharge patient home and to return if symptoms return. Qasim Macdonald DO, PGY3: Patient's mental status has now improved, patient is now conversant and interactive with grandson and per grandson patient is acting back at baseline no metabolic or infectious etiology to explain symptoms identified on imaging or labs.  While CT head was limited study, as patient returned back to baseline no concern for stroke at this time.  Had shared decision making conversation with grandson talked about possibility of repeating head CT and further imaging shared decision making to discharge patient home and to return if symptoms return. Pt's ambulation not at baseline but grandson says he is comfortable taking patient home and caring for the patient.

## 2023-06-09 NOTE — ED ADULT TRIAGE NOTE - CHIEF COMPLAINT QUOTE
Pt brought in from home by EMS for AMS since 10-11 am yesterday. Pt is ambulatory at baseline, now not walking or talking. Not withdrawing from painful stimuli. Dr. Denis called for stroke evaluation, no code stroke called.

## 2023-06-09 NOTE — ED ADULT NURSE NOTE - NSFALLHARMRISKINTERV_ED_ALL_ED

## 2023-06-09 NOTE — ED PROVIDER NOTE - PHYSICAL EXAMINATION
CONSTITUTIONAL: NAD  SKIN: Warm dry, normal skin turgor  HEAD: NCAT  EYES: L eye cataract, pt not cooperating w/ EOM  NECK: Supple; non tender. Full ROM.  CARD: RRR, no murmurs.  RESP: clear to ausculation b/l. No crackles or wheezing.  ABD: soft, non-tender, non-distended, no rebound or guarding.  MSK: Full ROM, no bony tenderness, no pedal edema, no calf tenderness  PSYCH: pt nonverbal, doesn't cooperate w/ exam CONSTITUTIONAL: ill appearing  SKIN: Warm dry, normal skin turgor  HEAD: NCAT  EYES: L eye cataract, pt not cooperating w/ EOM  NECK: Supple; non tender. Full ROM.  CARD: RRR, no murmurs.  RESP: clear to ausculation b/l. No crackles or wheezing.  ABD: soft, non-tender, non-distended, no rebound or guarding.  MSK: Full ROM, no bony tenderness, no pedal edema, no calf tenderness  PSYCH: pt nonverbal, doesn't cooperate w/ exam

## 2023-06-09 NOTE — ED ADULT NURSE NOTE - OBJECTIVE STATEMENT
Patient presents to ED from home via ambulance for worsening AMS, lethargy since yesterday morning. As per grandson Mr. Blum at bedside, patient had unclear speech and became unable to walk, which is different from baseline. Denies pain. Patient is AA&Ox2, in no acute distress, calm, cooperative. Follows commands. Respirations even, unlabored on room air. Denies CP, dizziness, SOB, dyspnea, N/V, fevers. 20G IV placed left AC, labs drawn and sent. NS given as ordered.

## 2023-06-09 NOTE — ED PROVIDER NOTE - CLINICAL SUMMARY MEDICAL DECISION MAKING FREE TEXT BOX
Patient is altered mental status for greater than 24 hours patient not eligible for code stroke stroke considered will get CT imaging of head, also considering delirium secondary to sepsis, patient is not febrile and has normal vital signs will get UA chest x-ray labs to rule out infectious etiology, no trauma identified, patient is otherwise well-appearing.  Patient will require admission if unable to ambulate and less responsive than normal, will assess for metabolic derangement as well.

## 2023-06-09 NOTE — ED PROVIDER NOTE - ATTENDING CONTRIBUTION TO CARE
Dr. Denis: This is a 89-year-old female  in the emergency department with 24 hours of declining mental status.  Grandson at bedside states that at baseline patient ambulates on her own, speaks normally, is very interactive with family.  Yesterday morning patient awoke in her usual state of health, few hours later she began to become less responsive, not speaking or acting like herself.  Symptoms continued to worsen today and so family called EMS.  As far as grandson is aware, patient was not complaining of nausea, vomiting, diarrhea, chest pain, shortness of breath, urinary complaints, fever.  On exam pt chronically-ill appearing but in NAD, heart RRR, lungs CTAB, abd NTND, extremities without swelling, strength equally diminished in all extremities and skin without rash.  Pt looking at me when she is spoken to, but not answering my questions, not following commands.

## 2023-06-09 NOTE — ED PROVIDER NOTE - NSFOLLOWUPINSTRUCTIONS_ED_ALL_ED_FT
You were seen in the Emergency Department for weakness.    Follow up with your primary care provider within 3-5 days.    If you have fever, chills, nausea, vomiting, new or worsening pain, or if you have any new symptoms return to the Emergency Department.

## 2023-06-09 NOTE — ED PROVIDER NOTE - OBJECTIVE STATEMENT
irvin at bedside Jean Valerio 89 female presents the ED for altered mental status 2 days ago last known well before bed.  Over the past day patient's had progressive weakness, has been less interactive, today patient was not cooperating with speaking or interacting family was concerned and brought to ER.  No recent complaints of fevers chills cough or other infectious symptoms.  Patient was well earlier in the week and at baseline of conversing and ambulating.  Today patient just looks at whoever is talking including grandson who is providing translation and does not follow commands.    grandson at bedside Jean Valerio

## 2023-06-09 NOTE — ED PROVIDER NOTE - PATIENT PORTAL LINK FT
You can access the FollowMyHealth Patient Portal offered by [facility] by registering at the following website: [email or web address]. By joining [employer]’s FollowMyHealth portal, you will also be able to view your health information using other applications (apps) compatible with our system.

## 2023-06-10 LAB
CULTURE RESULTS: SIGNIFICANT CHANGE UP
SPECIMEN SOURCE: SIGNIFICANT CHANGE UP

## 2024-01-26 NOTE — ED PROVIDER NOTE - PHYSICAL EXAMINATION
PHYSICAL EXAM:  GENERAL: NAD, Resting in bed  EYES: cataracts obstructing pupillary exam; EOMI, left eye with 1x 4cm laceration to the lower lid with associated hematoma to forehead   CHEST/LUNG: Clear to auscultation bilaterally; No rales, rhonchi, wheezing, or rubs. Unlabored respirations  HEART: Regular rate and rhythm; No murmurs, rubs, or gallops  ABDOMEN: Bowel sounds present; Soft, Nontender, Nondistended.  NERVOUS SYSTEM:  Alert & Oriented X3  MSK: FROM all 4 extremities, full and equal strength Dr Gin Garber- [place] [phone]

## 2024-05-03 NOTE — DISCHARGE NOTE ADULT - PATIENT PORTAL LINK FT
80 You can access the [facility] Patient Portal, offered by [facility], by registering with the following website: [email or web address]

## 2025-06-29 NOTE — ED ADULT NURSE NOTE - EXTENSIONS OF SELF_ADULT
Patient's blood pressure range in the last 24 hours was: BP  Min: 123/70  Max: 151/79.The patient's inpatient anti-hypertensive regimen is listed below:  Current Antihypertensives  metoprolol succinate (TOPROL-XL) 24 hr tablet 25 mg, Daily, Oral  losartan tablet 50 mg, Daily, Oral  amLODIPine tablet 2.5 mg, Nightly, Oral  cloNIDine tablet 0.2 mg, Every 6 hours PRN, Oral  hydrALAZINE injection 10 mg, Every 4 hours PRN, Intravenous    Plan  - BP is controlled, no changes needed to their regimen  -  contiue current meds   Cane